# Patient Record
Sex: FEMALE | Race: OTHER | URBAN - METROPOLITAN AREA
[De-identification: names, ages, dates, MRNs, and addresses within clinical notes are randomized per-mention and may not be internally consistent; named-entity substitution may affect disease eponyms.]

---

## 2017-04-17 ENCOUNTER — OUTPATIENT (OUTPATIENT)
Dept: OUTPATIENT SERVICES | Facility: HOSPITAL | Age: 37
LOS: 1 days | Discharge: HOME | End: 2017-04-17

## 2017-06-27 DIAGNOSIS — E78.5 HYPERLIPIDEMIA, UNSPECIFIED: ICD-10-CM

## 2017-06-27 DIAGNOSIS — Z00.00 ENCOUNTER FOR GENERAL ADULT MEDICAL EXAMINATION WITHOUT ABNORMAL FINDINGS: ICD-10-CM

## 2017-06-27 DIAGNOSIS — E11.9 TYPE 2 DIABETES MELLITUS WITHOUT COMPLICATIONS: ICD-10-CM

## 2019-01-19 ENCOUNTER — HOSPITAL ENCOUNTER (EMERGENCY)
Facility: HOSPITAL | Age: 39
Discharge: HOME | End: 2019-01-19
Attending: EMERGENCY MEDICINE
Payer: COMMERCIAL

## 2019-01-19 ENCOUNTER — APPOINTMENT (EMERGENCY)
Dept: RADIOLOGY | Facility: HOSPITAL | Age: 39
End: 2019-01-19
Payer: COMMERCIAL

## 2019-01-19 VITALS
HEART RATE: 96 BPM | BODY MASS INDEX: 27.32 KG/M2 | RESPIRATION RATE: 20 BRPM | OXYGEN SATURATION: 98 % | HEIGHT: 66 IN | TEMPERATURE: 98.5 F | SYSTOLIC BLOOD PRESSURE: 140 MMHG | WEIGHT: 170 LBS | DIASTOLIC BLOOD PRESSURE: 72 MMHG

## 2019-01-19 DIAGNOSIS — R05.9 COUGH: ICD-10-CM

## 2019-01-19 DIAGNOSIS — J06.9 URI (UPPER RESPIRATORY INFECTION): Primary | ICD-10-CM

## 2019-01-19 LAB
ANION GAP SERPL CALCULATED.3IONS-SCNC: 5 MMOL/L (ref 4–13)
ATRIAL RATE: 73 BPM
BASOPHILS # BLD AUTO: 0.07 THOUSANDS/ΜL (ref 0–0.1)
BASOPHILS NFR BLD AUTO: 1 % (ref 0–1)
BILIRUB UR QL STRIP: NEGATIVE
BUN SERPL-MCNC: 7 MG/DL (ref 5–25)
CALCIUM SERPL-MCNC: 8.9 MG/DL (ref 8.3–10.1)
CHLORIDE SERPL-SCNC: 104 MMOL/L (ref 100–108)
CLARITY UR: CLEAR
CO2 SERPL-SCNC: 27 MMOL/L (ref 21–32)
COLOR UR: YELLOW
CREAT SERPL-MCNC: 0.75 MG/DL (ref 0.6–1.3)
DEPRECATED D DIMER PPP: 1102 NG/ML (FEU)
EOSINOPHIL # BLD AUTO: 0.5 THOUSAND/ΜL (ref 0–0.61)
EOSINOPHIL NFR BLD AUTO: 5 % (ref 0–6)
ERYTHROCYTE [DISTWIDTH] IN BLOOD BY AUTOMATED COUNT: 13.2 % (ref 11.6–15.1)
EXT PREG TEST URINE: NEGATIVE
FLUAV AG SPEC QL: NORMAL
FLUBV AG SPEC QL: NORMAL
GFR SERPL CREATININE-BSD FRML MDRD: 100 ML/MIN/1.73SQ M
GLUCOSE SERPL-MCNC: 87 MG/DL (ref 65–140)
GLUCOSE UR STRIP-MCNC: NEGATIVE MG/DL
HCT VFR BLD AUTO: 43.8 % (ref 34.8–46.1)
HGB BLD-MCNC: 13.2 G/DL (ref 11.5–15.4)
HGB UR QL STRIP.AUTO: NEGATIVE
IMM GRANULOCYTES # BLD AUTO: 0.02 THOUSAND/UL (ref 0–0.2)
IMM GRANULOCYTES NFR BLD AUTO: 0 % (ref 0–2)
KETONES UR STRIP-MCNC: NEGATIVE MG/DL
LEUKOCYTE ESTERASE UR QL STRIP: NEGATIVE
LYMPHOCYTES # BLD AUTO: 1.7 THOUSANDS/ΜL (ref 0.6–4.47)
LYMPHOCYTES NFR BLD AUTO: 17 % (ref 14–44)
MCH RBC QN AUTO: 26.5 PG (ref 26.8–34.3)
MCHC RBC AUTO-ENTMCNC: 30.1 G/DL (ref 31.4–37.4)
MCV RBC AUTO: 88 FL (ref 82–98)
MONOCYTES # BLD AUTO: 1.25 THOUSAND/ΜL (ref 0.17–1.22)
MONOCYTES NFR BLD AUTO: 13 % (ref 4–12)
NEUTROPHILS # BLD AUTO: 6.49 THOUSANDS/ΜL (ref 1.85–7.62)
NEUTS SEG NFR BLD AUTO: 64 % (ref 43–75)
NITRITE UR QL STRIP: NEGATIVE
NRBC BLD AUTO-RTO: 0 /100 WBCS
P AXIS: 76 DEGREES
PH UR STRIP.AUTO: 6.5 [PH] (ref 4.5–8)
PLATELET # BLD AUTO: 265 THOUSANDS/UL (ref 149–390)
PMV BLD AUTO: 11.8 FL (ref 8.9–12.7)
POTASSIUM SERPL-SCNC: 4 MMOL/L (ref 3.5–5.3)
PR INTERVAL: 132 MS
PROT UR STRIP-MCNC: NEGATIVE MG/DL
QRS AXIS: 110 DEGREES
QRSD INTERVAL: 72 MS
QT INTERVAL: 402 MS
QTC INTERVAL: 442 MS
RBC # BLD AUTO: 4.99 MILLION/UL (ref 3.81–5.12)
RSV B RNA SPEC QL NAA+PROBE: NORMAL
SODIUM SERPL-SCNC: 136 MMOL/L (ref 136–145)
SP GR UR STRIP.AUTO: 1.01 (ref 1–1.03)
T WAVE AXIS: 16 DEGREES
TROPONIN I SERPL-MCNC: <0.02 NG/ML
UROBILINOGEN UR QL STRIP.AUTO: 0.2 E.U./DL
VENTRICULAR RATE: 73 BPM
WBC # BLD AUTO: 10.03 THOUSAND/UL (ref 4.31–10.16)

## 2019-01-19 PROCEDURE — 71046 X-RAY EXAM CHEST 2 VIEWS: CPT

## 2019-01-19 PROCEDURE — 36415 COLL VENOUS BLD VENIPUNCTURE: CPT | Performed by: EMERGENCY MEDICINE

## 2019-01-19 PROCEDURE — 94640 AIRWAY INHALATION TREATMENT: CPT

## 2019-01-19 PROCEDURE — 71275 CT ANGIOGRAPHY CHEST: CPT

## 2019-01-19 PROCEDURE — 85379 FIBRIN DEGRADATION QUANT: CPT | Performed by: EMERGENCY MEDICINE

## 2019-01-19 PROCEDURE — 93005 ELECTROCARDIOGRAM TRACING: CPT

## 2019-01-19 PROCEDURE — 93010 ELECTROCARDIOGRAM REPORT: CPT | Performed by: INTERNAL MEDICINE

## 2019-01-19 PROCEDURE — 84484 ASSAY OF TROPONIN QUANT: CPT | Performed by: EMERGENCY MEDICINE

## 2019-01-19 PROCEDURE — 80048 BASIC METABOLIC PNL TOTAL CA: CPT | Performed by: EMERGENCY MEDICINE

## 2019-01-19 PROCEDURE — 81025 URINE PREGNANCY TEST: CPT | Performed by: EMERGENCY MEDICINE

## 2019-01-19 PROCEDURE — 87631 RESP VIRUS 3-5 TARGETS: CPT | Performed by: EMERGENCY MEDICINE

## 2019-01-19 PROCEDURE — 96374 THER/PROPH/DIAG INJ IV PUSH: CPT

## 2019-01-19 PROCEDURE — 99284 EMERGENCY DEPT VISIT MOD MDM: CPT

## 2019-01-19 PROCEDURE — 81003 URINALYSIS AUTO W/O SCOPE: CPT

## 2019-01-19 PROCEDURE — 85025 COMPLETE CBC W/AUTO DIFF WBC: CPT | Performed by: EMERGENCY MEDICINE

## 2019-01-19 RX ORDER — KETOROLAC TROMETHAMINE 30 MG/ML
15 INJECTION, SOLUTION INTRAMUSCULAR; INTRAVENOUS ONCE
Status: COMPLETED | OUTPATIENT
Start: 2019-01-19 | End: 2019-01-19

## 2019-01-19 RX ORDER — IBUPROFEN 600 MG/1
600 TABLET ORAL EVERY 6 HOURS PRN
Qty: 30 TABLET | Refills: 0 | Status: SHIPPED | OUTPATIENT
Start: 2019-01-19 | End: 2021-09-14

## 2019-01-19 RX ORDER — FLUTICASONE PROPIONATE 50 MCG
1 SPRAY, SUSPENSION (ML) NASAL DAILY
Qty: 16 G | Refills: 0 | Status: SHIPPED | OUTPATIENT
Start: 2019-01-19 | End: 2021-09-14

## 2019-01-19 RX ADMIN — KETOROLAC TROMETHAMINE 15 MG: 30 INJECTION, SOLUTION INTRAMUSCULAR at 11:46

## 2019-01-19 RX ADMIN — IOHEXOL 85 ML: 350 INJECTION, SOLUTION INTRAVENOUS at 14:21

## 2019-01-19 RX ADMIN — ALBUTEROL SULFATE 5 MG: 2.5 SOLUTION RESPIRATORY (INHALATION) at 11:47

## 2019-01-19 NOTE — SEPSIS NOTE
Sepsis Note   Yamileth Ashraf 34 y o  female MRN: [id number]  Unit/Bed#: ED 06 Encounter: [id number]            Initial Sepsis Screening     [address] Name 01/19/19 1114                Is the patient's history suggestive of a new or worsening infection? (!)  Yes (Proceed)  -JE        Suspected source of infection pneumonia  -JE        Are two or more of the following signs & symptoms of infection both present and new to the patient? (!)  Yes (Proceed)  -JE        Indicate SIRS criteria Tachypnea > 20 resp per min; Tachycardia > 90 bpm  -JE        If the answer is yes to both questions, suspicion of sepsis is present          If severe sepsis is present AND tissue hypoperfusion perists in the hour after fluid resuscitation or lactate > 4, the patient meets criteria for SEPTIC SHOCK          Are any of the following organ dysfunction criteria present within 6 hours of suspected infection and SIRS criteria that are NOT considered to be chronic conditions? No  -JE        Organ dysfunction          Date of presentation of severe sepsis          Time of presentation of severe sepsis          Tissue hypoperfusion persists in the hour after crystalloid fluid administration, evidenced, by either:          Was hypotension present within one hour of the conclusion of crystalloid fluid administration?           Date of presentation of septic shock          Time of presentation of septic shock            User Key  (r) = Recorded By, (t) = Taken By, (c) = Cosigned By    Initials Name Provider Type    [address] [place] [address] Resident

## 2019-01-19 NOTE — ED ATTENDING ATTESTATION
Jeaneth Moore MD, saw and evaluated the patient  I have discussed the patient with the resident/non-physician practitioner and agree with the resident's/non-physician practitioner's findings, Plan of Care, and MDM as documented in the resident's/non-physician practitioner's note, except where noted  All available labs and Radiology studies were reviewed  At this point I agree with the current assessment done in the Emergency Department  I have conducted an independent evaluation of this patient a history and physical is as follows:    Patient presents to the emergency department with a chief complaint of upper respiratory infection symptoms  The patient reports approximately 6 days ago she developed nasal congestion, mild sore throat, myalgias, a cough productive of yellow sputum, and arthralgias  The patient denies any fevers and is tolerating p o  Well  The patient admits to a 1 year history of pain in her upper central chest that waxes and wanes  This is not exertional and when it does occur worsens with cough, deep breath, palpation, and bending or other certain positions  The patient denies any nausea associated with the pain, diaphoresis, or shortness of breath  Patient denies any lower extremity symptoms of a DVT  Physical exam demonstrates a pleasant alert nontoxic female in no apparent distress  The patient has obvious nasal congestion and edematous turbinates  The throat is mildly erythematous without abscess or exudate  The neck is supple nontender  Oral mucosa is moist   The lungs have equal breath sounds and mild end expiratory wheezes  Work of breathing is normal   There is no accessory muscle use  Heart had a regular rate rhythm  The chest is mildly tender to palpation at the upper sternum  There is no deformity, crepitance, induration, or fluctuance  The lower extremities are symmetric and nontender without edema  The upper extremities are symmetric and nontender  Neurologic exam is normal   Skin had no rash  Initial Impression:  Chronic current chest pain that has been stable and a viral upper respiratory tract infection      Critical Care Time  CritCare Time    Procedures

## 2019-01-19 NOTE — ED PROVIDER NOTES
History  Chief Complaint   Patient presents with    URI     family reports congestion, sore throat, chest, and back for 3 days  75-year-old female past medical history asthma mild intermittent presents to emergency department for evaluation of upper respiratory symptoms  Patient states that she was in [place] for 1 month visiting family in returned yesterday  Patient states that approximately 3 days ago while still in [facility] she started developed nasal congestion, rhinorrhea, cough, sore throat, arthralgias/myalgias  Patient states the symptoms have not improved has not taken anything at home for self-care yet  Patient also states he has productive cough producing a greenish sputum  Patient did not receive the flu vaccine  Patient also complains of 12 month history of intermittent left-sided chest pain  Pain is described as a intermittent mild 4/10 pain without radiation and is not associated with exertion or other activities  Patient states that since arriving home from [place] she has noticed the left-sided chest pain and that is increased with deep inhalation  Patient states the pain has been persistent for over 24 hours  Patient again states she flew from [facility] yesterday and is on oral contraceptive pills  Denies drug or alcohol use, chest wall trauma, fever, chills, nausea, vomiting, diarrhea, dysuria, hematuria, hematochezia, melena, genital discharge, abdominal pain, flank pain, chest pain, shortness of breath, dyspnea, pleuritic-type chest pain, history DVT/PE, history cancer, hemoptysis, rash, extremity weakness, facial droop, difficulty swallowing, suicidal ideation, homicide ideation, hallucinations  Patient does not have any risk factors for CAD such as diabetes mellitus, hypertension, hyperlipidemia, CKD, history of tobacco abuse, history of cocaine abuse, or family history of early CAD                     None       Past Medical History:   Diagnosis Date    Asthma        History reviewed  No pertinent surgical history  History reviewed  No pertinent family history  I have reviewed and agree with the history as documented  Social History   Substance Use Topics    Smoking status: Never Smoker    Smokeless tobacco: Never Used    Alcohol use Yes        Review of Systems   Constitutional: Negative for chills, diaphoresis, fatigue and fever  HENT: Positive for congestion, rhinorrhea and sore throat  Negative for ear discharge, facial swelling, hearing loss, sinus pain, sinus pressure, sneezing, tinnitus and trouble swallowing  Eyes: Negative for pain, discharge and redness  Respiratory: Positive for cough  Negative for choking, chest tightness, shortness of breath, wheezing and stridor  Cardiovascular: Negative for chest pain, palpitations and leg swelling  Gastrointestinal: Negative for abdominal distention, abdominal pain, blood in stool, constipation, diarrhea, nausea and vomiting  Endocrine: Negative for cold intolerance, polydipsia and polyuria  Genitourinary: Negative for difficulty urinating, dysuria, enuresis, flank pain, frequency and hematuria  Musculoskeletal: Negative for arthralgias, back pain, gait problem and neck stiffness  Skin: Negative for rash and wound  Neurological: Negative for dizziness, seizures, syncope, weakness, numbness and headaches  Hematological: Negative for adenopathy  Psychiatric/Behavioral: Negative for agitation, confusion, hallucinations, sleep disturbance and suicidal ideas  All other systems reviewed and are negative        Physical Exam  ED Triage Vitals [01/19/19 1038]   Temperature Pulse Respirations Blood Pressure SpO2   98 5 °F (36 9 °C) 94 22 141/73 100 %      Temp Source Heart Rate Source Patient Position - Orthostatic VS BP Location FiO2 (%)   Oral Monitor Sitting Left arm --      Pain Score       8           Orthostatic Vital Signs  Vitals:    01/19/19 1038 01/19/19 1330 01/19/19 1431   BP: 141/73 154/81 140/72   Pulse: 94 101 96   Patient Position - Orthostatic VS: Sitting Lying Lying       Physical Exam   Constitutional: She is oriented to person, place, and time  She appears well-developed and well-nourished  No distress  HENT:   Head: Normocephalic and atraumatic  Right Ear: External ear normal  Tympanic membrane is not injected, not erythematous, not retracted and not bulging  Left Ear: External ear normal  Tympanic membrane is not injected, not erythematous, not retracted and not bulging  Nose: Mucosal edema and rhinorrhea present  No sinus tenderness  No epistaxis  Right sinus exhibits no frontal sinus tenderness  Left sinus exhibits no frontal sinus tenderness  Mouth/Throat: Uvula is midline and mucous membranes are normal  Posterior oropharyngeal erythema present  No oropharyngeal exudate, posterior oropharyngeal edema or tonsillar abscesses  Eyes: Pupils are equal, round, and reactive to light  Conjunctivae and EOM are normal  Right eye exhibits no discharge  Left eye exhibits no discharge  Neck: No JVD present  Cardiovascular: Normal rate, regular rhythm, normal heart sounds and intact distal pulses  Exam reveals no gallop and no friction rub  No murmur heard  Pulmonary/Chest: Effort normal  No stridor  No respiratory distress  She has no decreased breath sounds  She has wheezes in the right upper field and the left upper field  She has no rhonchi  She has no rales  Abdominal: Soft  Bowel sounds are normal  She exhibits no distension and no mass  There is no tenderness  There is no rebound and no guarding  Musculoskeletal: Normal range of motion  She exhibits no edema, tenderness or deformity  Lymphadenopathy:     She has no cervical adenopathy  Neurological: She is alert and oriented to person, place, and time  She has normal strength  No cranial nerve deficit or sensory deficit  GCS eye subscore is 4  GCS verbal subscore is 5  GCS motor subscore is 6     Reflex Scores:       Patellar reflexes are 2+ on the right side and 2+ on the left side  UE and LE 5/5 strength, No focal neuro deficits  Skin: Skin is warm, dry and intact  Capillary refill takes less than 2 seconds  She is not diaphoretic  Psychiatric: She has a normal mood and affect  Her speech is normal and behavior is normal  Judgment and thought content normal    Nursing note and vitals reviewed        ED Medications  Medications   ketorolac (TORADOL) injection 15 mg (15 mg Intravenous Given 1/19/19 1146)   albuterol inhalation solution 5 mg (5 mg Nebulization Given 1/19/19 1147)   iohexol (OMNIPAQUE) 350 MG/ML injection (MULTI-DOSE) 85 mL (85 mL Intravenous Given 1/19/19 1421)       Diagnostic Studies  Results Reviewed     Procedure Component Value Units Date/Time    POCT urinalysis dipstick [[id number]]  (Normal) Resulted:  01/19/19 1406    Lab Status:  Final result Specimen:  Urine Updated:  01/19/19 1406    POCT pregnancy, urine [[id number]]  (Normal) Resulted:  01/19/19 1406    Lab Status:  Final result Updated:  01/19/19 1406     EXT PREG TEST UR (Ref: Negative) Negative    ED Urine Macroscopic [[id number]] Collected:  01/19/19 1408    Lab Status:  Final result Specimen:  Urine Updated:  01/19/19 1406     Color, UA Yellow     Clarity, UA Clear     pH, UA 6 5     Leukocytes, UA Negative     Nitrite, UA Negative     Protein, UA Negative mg/dl      Glucose, UA Negative mg/dl      Ketones, UA Negative mg/dl      Urobilinogen, UA 0 2 E U /dl      Bilirubin, UA Negative     Blood, UA Negative     Specific Gravity, UA 1 015    Narrative:       CLINITEK RESULT    D-Dimer [[id number]]  (Abnormal) Collected:  01/19/19 1147    Lab Status:  Final result Specimen:  Blood from Arm, Right Updated:  01/19/19 1249     D-Dimer, Quant 1,102 (H) ng/ml (FEU)     Troponin I [[id number]]  (Normal) Collected:  01/19/19 1147    Lab Status:  Final result Specimen:  Blood from Arm, Right Updated:  01/19/19 1224     Troponin I <0 02 ng/mL Basic metabolic panel [[id number]] Collected:  01/19/19 1147    Lab Status:  Final result Specimen:  Blood from Arm, Right Updated:  01/19/19 1220     Sodium 136 mmol/L      Potassium 4 0 mmol/L      Chloride 104 mmol/L      CO2 27 mmol/L      ANION GAP 5 mmol/L      BUN 7 mg/dL      Creatinine 0 75 mg/dL      Glucose 87 mg/dL      Calcium 8 9 mg/dL      eGFR 100 ml/min/1 73sq m     Narrative:         National Kidney Disease Education Program recommendations are as follows:  GFR calculation is accurate only with a steady state creatinine  Chronic Kidney disease less than 60 ml/min/1 73 sq  meters  Kidney failure less than 15 ml/min/1 73 sq  meters  CBC and differential [[id number]]  (Abnormal) Collected:  01/19/19 1147    Lab Status:  Final result Specimen:  Blood from Arm, Right Updated:  01/19/19 1201     WBC 10 03 Thousand/uL      RBC 4 99 Million/uL      Hemoglobin 13 2 g/dL      Hematocrit 43 8 %      MCV 88 fL      MCH 26 5 (L) pg      MCHC 30 1 (L) g/dL      RDW 13 2 %      MPV 11 8 fL      Platelets 273 Thousands/uL      nRBC 0 /100 WBCs      Neutrophils Relative 64 %      Immat GRANS % 0 %      Lymphocytes Relative 17 %      Monocytes Relative 13 (H) %      Eosinophils Relative 5 %      Basophils Relative 1 %      Neutrophils Absolute 6 49 Thousands/µL      Immature Grans Absolute 0 02 Thousand/uL      Lymphocytes Absolute 1 70 Thousands/µL      Monocytes Absolute 1 25 (H) Thousand/µL      Eosinophils Absolute 0 50 Thousand/µL      Basophils Absolute 0 07 Thousands/µL     Influenza A/B and RSV by PCR [[id number]] Collected:  01/19/19 1147    Lab Status: In process Specimen:  Nasopharyngeal from Nasopharyngeal Swab Updated:  01/19/19 1153                 CTA ED chest PE study   ED Interpretation by Lisette Tubbs DO (01/19 1440)      No pulmonary arterial embolism; somewhat limited evaluation secondary to motion  No pulmonary consolidation                    Workstation performed: [id number] Final Result by Antolin Lambert MD (01/19 1438)      No pulmonary arterial embolism; somewhat limited evaluation secondary to motion  No pulmonary consolidation  Workstation performed: [id number]         XR chest 2 views   ED Interpretation by Aleks Maria DO (01/19 1234)   No focal infiltrates             Procedures  ECG 12 Lead Documentation  Date/Time: 1/19/2019 12:32 PM  Performed by: Venus Ruiz  Authorized by: Ha HER     Indications / Diagnosis:  Chest pain x 1 year  Patient location:  ED  Previous ECG:     Previous ECG:  Unavailable    Comparison to cardiac monitor: Yes    Interpretation:     Interpretation: non-specific    Rate:     ECG rate:  73    ECG rate assessment: normal    Rhythm:     Rhythm: sinus rhythm    Ectopy:     Ectopy: none    QRS:     QRS axis:  Right    QRS intervals:  Normal  Conduction:     Conduction: normal    ST segments:     ST segments:  Normal  T waves:     T waves: normal            Phone Consults  ED Phone Contact    ED Course               PERC Rule for PE      Most Recent Value   PERC Rule for PE   Age >=50  0 Filed at: 01/19/2019 1111   HR >=100  0 Filed at: 01/19/2019 1111   O2 Sat on room air < 95%  0 Filed at: 01/19/2019 1111   History of PE or DVT  0 Filed at: 01/19/2019 1111   Recent trauma or surgery  0 Filed at: 01/19/2019 1111   Hemoptysis  0 Filed at: 01/19/2019 1111   Exogenous estrogen  1 Filed at: 01/19/2019 1111   Unilateral leg swelling  0 Filed at: 01/19/2019 1111   PERC Rule for PE Results  1 Filed at: 01/19/2019 1111                Initial Sepsis Screening     Row Name 01/19/19 1114                Is the patient's history suggestive of a new or worsening infection? (!)  Yes (Proceed)  -JE        Suspected source of infection pneumonia  -JE        Are two or more of the following signs & symptoms of infection both present and new to the patient?  (!)  Yes (Proceed)  -JE        Indicate SIRS criteria Tachypnea > 20 resp per min; Tachycardia > 90 bpm  -JE        If the answer is yes to both questions, suspicion of sepsis is present          If severe sepsis is present AND tissue hypoperfusion perists in the hour after fluid resuscitation or lactate > 4, the patient meets criteria for SEPTIC SHOCK          Are any of the following organ dysfunction criteria present within 6 hours of suspected infection and SIRS criteria that are NOT considered to be chronic conditions? No  -JE        Organ dysfunction          Date of presentation of severe sepsis          Time of presentation of severe sepsis          Tissue hypoperfusion persists in the hour after crystalloid fluid administration, evidenced, by either:          Was hypotension present within one hour of the conclusion of crystalloid fluid administration?         Date of presentation of septic shock          Time of presentation of septic shock            User Key  (r) = Recorded By, (t) = Taken By, (c) = Cosigned By    Initials Name Provider Type    [address] [place] [address], [facility]            Chanel Jaramillo Criteria for PE      Most Recent Value   Darrell' Criteria for PE   Clinical signs and symptoms of DVT  0 Filed at: 01/19/2019 1111   PE is primary diagnosis or equally likely  0 Filed at: 01/19/2019 1111   HR >100  0 Filed at: 01/19/2019 1111   Immobilization at least 3 days or Surgery in the previous 4 weeks  0 Filed at: 01/19/2019 1111   Previous, objectively diagnosed PE or DVT  0 Filed at: 01/19/2019 1111   Hemoptysis  0 Filed at: 01/19/2019 1111   Malignancy with treatment within 6 months or palliative  0 Filed at: 01/19/2019 1111   Wells' Criteria Total  0 Filed at: 01/19/2019 1111            MDM  Number of Diagnoses or Management Options  Cough: new and requires workup  URI (upper respiratory infection): new and requires workup  Diagnosis management comments: [place], PERC Pos, Dimer 1100 --CT PE study ordered  Dimer, trop, EKG, BMP, CBC, Neb, flu swab    1  URI with cough  -Motrin, Flonase, PCP f/u with [facility]  -ED PRN      CritCare Time    Disposition  Final diagnoses:   URI (upper respiratory infection)   Cough     Time reflects when diagnosis was documented in both MDM as applicable and the Disposition within this note     Time User Action Codes Description Comment    1/19/2019  2:41 PM Kenneth Rendon Add [J06 9] URI (upper respiratory infection)     1/19/2019  2:41 PM Kenneth Rendon Add [R05] Cough       ED Disposition     ED Disposition Condition Comment    Discharge  Leonora Ng discharge to home/self care  Condition at discharge: Good        Follow-up Information     Follow up With Specialties Details Why Contact Info Additional Information    [employer]    [phone]       [address] [place] [address] Emergency Department Emergency Medicine Go to If symptoms worsen, As needed [address] [facility] ED, [facility] , [place], [place], [address]          Patient's Medications   Discharge Prescriptions    FLUTICASONE (FLONASE) 50 MCG/ACT NASAL SPRAY    1 spray into each nostril daily       Start Date: 1/19/2019 End Date: --       Order Dose: 1 spray       Quantity: 16 g    Refills: 0    IBUPROFEN (MOTRIN) 600 MG TABLET    Take 1 tablet (600 mg total) by mouth every 6 (six) hours as needed for moderate pain or headaches       Start Date: 1/19/2019 End Date: --       Order Dose: 600 mg       Quantity: 30 tablet    Refills: 0     No discharge procedures on file  ED Provider  Attending physically available and evaluated Leonora Ng  I managed the patient along with the ED Attending      Electronically Signed by         Agueda Quezada DO  01/19/19 3529

## 2019-01-19 NOTE — DISCHARGE INSTRUCTIONS
Bronquitis aguda   LO QUE NECESITA SABER:   La bronquitis aguda es la inflamación e irritación de ina vías respiratorias  Esta irritación puede provocar que tosa o que tenga otros problemas de respiración  La bronquitis aguda suele comenzar debido a otra enfermedad, elio un resfriado o la gripe  La enfermedad se propaga de montoya nariz y garganta a montoya tráquea y Marcine Stevie  La bronquitis a menudo es conocida elio resfriado de pecho  La bronquitis aguda generalmente dura alrededor de 3 a 6 semanas y no es keren enfermedad grave  La tos podría durar por varias semanas  INSTRUCCIONES SOBRE EL [facility]ITAL[facility]:   Regrese a la ria de emergencias si:   · Usted expectora radu  · Ina labios o uñas de los dedos se ponen azules  · Usted siente que no está recibiendo suficiente aire cuando respira  Pregúntele a montoya [place] vitaminas y minerales son adecuados para usted  · Usted tiene fiebre  · Ina problemas respiratorios no desaparecen o empeoran  · Montoya tos no mejora dentro de 4 semanas  · Usted tiene preguntas o inquietudes acerca de montoya condición o cuidado  Cuidados personales:   · Descanse más  El descanso ayuda a montoya cuerpo a sanar  Empiece a hacer un poco más día a día  Descanse cuando usted sienta que es necesario  · Evite irritantes en el aire  Evite productos químicos, gases y polvo  Use keren mascarilla si tiene que trabajar alrededor de polvo o gases  Permanezca dentro cuando los niveles de contaminación forrest altos  Si tiene alergias, permanezca adentro cuando el conteo de polen sea CROSSCANONBY  No use productos en aerosol, elio desodorante, aerosol contra los insectos y aerosol para el corrie  · No fume o esté alrededor de personas que fuman  La nicotina y otros químicos en los cigarrillos y cigarros dañan la cilia que saca la mucosidad de ina pulmones  Pida información a montoya médico si usted actualmente fuma y necesita ayuda para dejar de fumar   Los cigarrillos electrónicos o tabaco sin humo todavía contienen nicotina  Consulte con montoya médico antes de [employer]  · [address] se le haya indicado  Los líquidos ayudan a mantener humectadas gaby vías respiratorias y ayudarle a eliminar las flemas por medio de la tos  Es posible que usted necesite luke más líquidos si tiene bronquitis [place]  Pregunte cuánto líquido debe luke cada día y cuáles líquidos son los más adecuados para usted  · Use un humidificador o vaporizador  Use un humidificador de clarisa frío o un vaporizador para elevar la humedad en montoya casa  [place] podría ayudarle a respirar más fácilmente y al mismo tiempo disminuir la tos  Disminuya montoya riesgo para la bronquitis aguda:   · Vaya a que le pongan las vacunas necesarias  Pregúntele a montoya médico si usted debería ser vacunado contra la gripe o la neumonía  · Evite la propagación de gérmenes  Usted puede disminuir montoya riesgo de bronquitis [place] y otras enfermedades de la siguiente manera:     ¨ [place] frecuentemente con agua y Brandan  Lleve keren loción o gel antiséptico para las abimael  Usted puede usar la loción o el gel para limpiar gaby abimael cuando no haya agua disponible  ¨ No se toque los ojos, la nariz o la boca a menos que se haya lavado las abimael gustavo  ¨ Siempre cubra montoya boca al toser para evitar la propagación de gérmenes  Lo mejor es toser en un pañuelo desechable o en la manga de montoya camisa, en lugar de en montoya mano  Pídale a los que le rodean que cubran gaby bocas al toser  ¨ Trate de evitar a las personas que están resfriadas o tienen gripe  Si usted está enfermo, manténgase alejado de otras personas lo más que pueda  Medicamentos:  Montoya médico podría  darle cualquiera de lo siguiente:  · El ibuprofeno o el acetaminofeno  son medicamentos que pueden ayudar a bajar montoya fiebre  Están disponibles sin receta médica  Consulte con montoya médico cuál medicamento es el adecuado para usted  Pregunte la cantidad y la frecuencia con que debe tomarlos  [address]  Estos medicamentos pueden provocar sangrado estomacal si no se johanna correctamente  El ibuprofeno puede provocar daño al Lender Soler  No tome ibuprofeno si usted tiene enfermedad de los riñones, [place] o si es alérgico a la aspirina  El acetaminofeno puede dañar el hígado  No tome más de 4,000 miligramos en 24 horas  · Los descongestionantes  ayudan a despejar la mucosidad en gaby pulmones y que sea más fácil expectorarla  [place] puede ayudarle a respirar mejor  · Los jarabes para la tos  disminuyen montoya necesidad de toser  Si montoya tos produce mucosidad, no tome un supresor de la tos a menos que montoya médico se lo indique  Montoya médico podría sugerirle que tome un supresor de la tos en la noche para que pueda descansar  · Inhaladores  podrían ser Emerson Miu  Montoya médico podría darle ana maria o más inhaladores para ayudarle a respirar más fácil y [place] menos tos  Un inhalador le administra medicamento para abrir gaby vías aéreas  Pídale a montoya médico que le muestre cómo usar montoya inhalador correctamente  · [place] gaby medicamentos elio se le haya indicado  Consulte con montoya médico si usted nadiya que montoya medicamento no le está ayudando o si presenta efectos secundarios  Infórmele si es alérgico a algún medicamento  Mantenga keren lista actualizada de los [place], las vitaminas y los productos herbales que radhika  Incluya los siguientes datos de los medicamentos: cantidad, frecuencia y motivo de administración  Traiga con usted la lista o los envases de la píldoras a gaby citas de seguimiento  Lleve la lista de los medicamentos con usted en marni de keren emergencia  Acuda a gaby consultas de control con montoya médico según le indicaron  Escriba las preguntas que tenga para que recuerde hacerlas nidhi gaby citas de seguimiento  © 2017 [facility] Information is for End User's use only and may not be sold, redistributed or otherwise used for commercial purposes   All illustrations and images included in [employer] are the copyrighted property of A D A M , Inc  or Zhang Lerma  Esta información es sólo para uso en educación  Montoya intención no es darle un consejo médico sobre enfermedades o tratamientos  Colsulte con montoya Rosemarie Lesser farmacéutico antes de seguir cualquier régimen médico para saber si es seguro y efectivo para usted  Infección respiratoria superior, cuidados ambulatorios   INFORMACIÓN GENERAL:   Keren infección respiratoria superior  también se conoce elio el resfriado común  Esta puede afectar montoya nariz, garganta, oídos y los senos frontales de la mindi  Los síntomas más comunes incluyen los siguientes:   · Secreción nasal o nariz tapada    · Estornudos y tos    · [place] irritada o ronquera    · Ojos enrojecidos, acuosos e irritados    · Cansancio o desasosiego    · [place] y fiebre    · Dolor de jose, anthony corporales o músculos adoloridos  Busque cuidados inmediatos para los siguientes síntomas:   · Anthony de jose o jennifer rígido    · Luces brillantes que lastiman gaby ojos    · Dolor de pecho o dificultad para respirar  El tratamiento para keren infección respiratoria superior  puede llegar a incluir cualquiera de los siguientes:  · Los descongestionantes  ayudan a disminuir la congestión nasal y ayudarlo a respirar  No use aerosol descongestionante por más de unos pocos días  · Los supresores de tos  ayudan a disminuir la tos  Pregúntele a montoya médico cuál tipo de medicamento para la tos es más adecuada para usted  Algunos supresores de tos requieren [employer], otros no  · Los antiiflamatorios no esteroideos (AINEs) ayudan a reducir inflamación y dolor o [place]  [place] medicamento esta disponible con o sin keren receta médica  Los AINEs podrían causar sangrado estomacal o problemas en los riñones en [employer]  Si usted esta tomando un anticoágulante, siempre  pregunte a montoya proveedor de teresa si los AINEs son seguros para usted   [address] [facility] medicamento, jefe siempre la etiqueta de información y siga gaby indicaciones  Cuide de montoya infección respiratoria superior:   · Descanse  hasta que no tenga fiebre o se siente mejor  · [place] líquidos según indicaciones para prevenir la deshidratación  [place] 8 a 10 tazas de líquidos al día  Los líquidos buenos incluyen el agua, ginger ale, té o jugos de frutas  · Abiel gárgaras  con agua tibia para que montoya garganta irritada se sienta mejor  Abiel agua salada añadiendo 1/4 de cucharadita de sal a 1 taza de agua tibia  Usted puede también chupar dulces duros o pastillas para la garganta  · Las gotas nasales de salina  ayudan a aliviar montoya congestión y pueden comprarse sin keren receta  · Rumson un baño o ducha tibia  para ayudarle a disminuir los anthony y [place] a respirar mejor  · Use un humidificador de clarisa frío  para aumentar la humedad en el aire y hacer el respirar más fácil  No use un humidificador de clarisa tibio  Prevenga el contagio de gérmenes:   · Evite a otras personas por los primeros 2 a 3 días de montoya resfriado  Los gérmenes se propagan fácilmente nidhi Anuj  · No comparta alimentos, bebidas,  toallas o artículos personas con [employer]  · Lávese las abimael con frecuencia  Use agua y Brandan  [employer] las abimael después de usar el baño, cambiar el pañal de un susan o estornudar  Lávese las abimael antes de preparar o consumir alimentos  Cúbrase la boca y Susy Rumson and Benjamín con keren toallita desechable cuando estornuda o tose  Programe keren manny con montoya proveedor de [employer] se le haya indicado: Anote gaby preguntas para que se acuerde de hacerlas nidhi gaby visitas  ACUERDOS SOBRE MONTOYA CUIDADO:   Usted tiene el derecho de participar en la planificación de montoya cuidado  Aprenda todo lo que pueda sobre montoya condición y elio darle tratamiento  Discuta con gaby médicos gaby opciones de tratamiento para juntos decidir el cuidado que usted quiere recibir   Usted siempre tiene el derecho a rechazar montoya tratamiento  Esta información es sólo para uso en educación  Montoya intención no es darle un consejo médico sobre enfermedades o tratamientos  Colsulte con montoya Enrique Pinta farmacéutico antes de seguir cualquier régimen médico para saber si es seguro y efectivo para usted  © 2014 [address] is for End User's use only and may not be sold, redistributed or otherwise used for commercial purposes  All illustrations and images included in CareNotes® are the copyrighted property of A D A M , Inc  or Zhang Lerma

## 2019-05-22 ENCOUNTER — HOSPITAL ENCOUNTER (EMERGENCY)
Facility: HOSPITAL | Age: 39
Discharge: HOME | End: 2019-05-22
Attending: EMERGENCY MEDICINE | Admitting: EMERGENCY MEDICINE
Payer: COMMERCIAL

## 2019-05-22 VITALS
DIASTOLIC BLOOD PRESSURE: 88 MMHG | TEMPERATURE: 98.3 F | BODY MASS INDEX: 28.75 KG/M2 | RESPIRATION RATE: 19 BRPM | SYSTOLIC BLOOD PRESSURE: 146 MMHG | OXYGEN SATURATION: 100 % | HEART RATE: 64 BPM | WEIGHT: 178.13 LBS

## 2019-05-22 DIAGNOSIS — R53.1 GENERALIZED WEAKNESS: Primary | ICD-10-CM

## 2019-05-22 DIAGNOSIS — R42 DIZZINESS: ICD-10-CM

## 2019-05-22 LAB
ALBUMIN SERPL BCP-MCNC: 3.7 G/DL (ref 3.5–5)
ALP SERPL-CCNC: 74 U/L (ref 46–116)
ALT SERPL W P-5'-P-CCNC: 10 U/L (ref 12–78)
ANION GAP SERPL CALCULATED.3IONS-SCNC: 3 MMOL/L (ref 4–13)
AST SERPL W P-5'-P-CCNC: 13 U/L (ref 5–45)
ATRIAL RATE: 57 BPM
BASOPHILS # BLD AUTO: 0.07 THOUSANDS/ΜL (ref 0–0.1)
BASOPHILS NFR BLD AUTO: 1 % (ref 0–1)
BILIRUB SERPL-MCNC: 0.47 MG/DL (ref 0.2–1)
BUN SERPL-MCNC: 10 MG/DL (ref 5–25)
CALCIUM SERPL-MCNC: 8.6 MG/DL (ref 8.3–10.1)
CHLORIDE SERPL-SCNC: 105 MMOL/L (ref 100–108)
CO2 SERPL-SCNC: 28 MMOL/L (ref 21–32)
CREAT SERPL-MCNC: 0.79 MG/DL (ref 0.6–1.3)
EOSINOPHIL # BLD AUTO: 0.34 THOUSAND/ΜL (ref 0–0.61)
EOSINOPHIL NFR BLD AUTO: 4 % (ref 0–6)
ERYTHROCYTE [DISTWIDTH] IN BLOOD BY AUTOMATED COUNT: 12.8 % (ref 11.6–15.1)
GFR SERPL CREATININE-BSD FRML MDRD: 94 ML/MIN/1.73SQ M
GLUCOSE SERPL-MCNC: 100 MG/DL (ref 65–140)
HCT VFR BLD AUTO: 42.4 % (ref 34.8–46.1)
HGB BLD-MCNC: 13 G/DL (ref 11.5–15.4)
IMM GRANULOCYTES # BLD AUTO: 0.02 THOUSAND/UL (ref 0–0.2)
IMM GRANULOCYTES NFR BLD AUTO: 0 % (ref 0–2)
LYMPHOCYTES # BLD AUTO: 2.25 THOUSANDS/ΜL (ref 0.6–4.47)
LYMPHOCYTES NFR BLD AUTO: 26 % (ref 14–44)
MCH RBC QN AUTO: 27 PG (ref 26.8–34.3)
MCHC RBC AUTO-ENTMCNC: 30.7 G/DL (ref 31.4–37.4)
MCV RBC AUTO: 88 FL (ref 82–98)
MONOCYTES # BLD AUTO: 0.7 THOUSAND/ΜL (ref 0.17–1.22)
MONOCYTES NFR BLD AUTO: 8 % (ref 4–12)
NEUTROPHILS # BLD AUTO: 5.43 THOUSANDS/ΜL (ref 1.85–7.62)
NEUTS SEG NFR BLD AUTO: 61 % (ref 43–75)
NRBC BLD AUTO-RTO: 0 /100 WBCS
P AXIS: 70 DEGREES
PLATELET # BLD AUTO: 273 THOUSANDS/UL (ref 149–390)
PMV BLD AUTO: 11.6 FL (ref 8.9–12.7)
POTASSIUM SERPL-SCNC: 4.5 MMOL/L (ref 3.5–5.3)
PR INTERVAL: 148 MS
PROT SERPL-MCNC: 7.3 G/DL (ref 6.4–8.2)
QRS AXIS: 52 DEGREES
QRSD INTERVAL: 74 MS
QT INTERVAL: 420 MS
QTC INTERVAL: 408 MS
RBC # BLD AUTO: 4.81 MILLION/UL (ref 3.81–5.12)
SODIUM SERPL-SCNC: 136 MMOL/L (ref 136–145)
T WAVE AXIS: 4 DEGREES
TSH SERPL DL<=0.05 MIU/L-ACNC: 0.8 UIU/ML (ref 0.36–3.74)
VENTRICULAR RATE: 57 BPM
WBC # BLD AUTO: 8.81 THOUSAND/UL (ref 4.31–10.16)

## 2019-05-22 PROCEDURE — 36415 COLL VENOUS BLD VENIPUNCTURE: CPT | Performed by: EMERGENCY MEDICINE

## 2019-05-22 PROCEDURE — 85025 COMPLETE CBC W/AUTO DIFF WBC: CPT | Performed by: EMERGENCY MEDICINE

## 2019-05-22 PROCEDURE — 84443 ASSAY THYROID STIM HORMONE: CPT | Performed by: EMERGENCY MEDICINE

## 2019-05-22 PROCEDURE — 93005 ELECTROCARDIOGRAM TRACING: CPT

## 2019-05-22 PROCEDURE — 93010 ELECTROCARDIOGRAM REPORT: CPT | Performed by: INTERNAL MEDICINE

## 2019-05-22 PROCEDURE — 99284 EMERGENCY DEPT VISIT MOD MDM: CPT | Performed by: EMERGENCY MEDICINE

## 2019-05-22 PROCEDURE — 99285 EMERGENCY DEPT VISIT HI MDM: CPT

## 2019-05-22 PROCEDURE — 80053 COMPREHEN METABOLIC PANEL: CPT | Performed by: EMERGENCY MEDICINE

## 2019-05-22 RX ORDER — FLUTICASONE FUROATE AND VILANTEROL 200; 25 UG/1; UG/1
1 POWDER RESPIRATORY (INHALATION) DAILY
COMMUNITY
End: 2021-09-14

## 2019-05-22 RX ORDER — ALBUTEROL SULFATE 90 UG/1
1 AEROSOL, METERED RESPIRATORY (INHALATION) EVERY 6 HOURS PRN
COMMUNITY

## 2019-05-22 RX ORDER — MONTELUKAST SODIUM 10 MG/1
10 TABLET ORAL
COMMUNITY

## 2019-07-26 ENCOUNTER — HOSPITAL ENCOUNTER (EMERGENCY)
Facility: HOSPITAL | Age: 39
Discharge: HOME | End: 2019-07-26
Attending: EMERGENCY MEDICINE | Admitting: EMERGENCY MEDICINE
Payer: COMMERCIAL

## 2019-07-26 VITALS
SYSTOLIC BLOOD PRESSURE: 144 MMHG | WEIGHT: 175 LBS | RESPIRATION RATE: 20 BRPM | HEART RATE: 85 BPM | BODY MASS INDEX: 28.25 KG/M2 | TEMPERATURE: 98.1 F | DIASTOLIC BLOOD PRESSURE: 99 MMHG | OXYGEN SATURATION: 100 %

## 2019-07-26 DIAGNOSIS — M54.42 ACUTE LEFT-SIDED LOW BACK PAIN WITH LEFT-SIDED SCIATICA: Primary | ICD-10-CM

## 2019-07-26 LAB
BACTERIA UR QL AUTO: ABNORMAL /HPF
BILIRUB UR QL STRIP: NEGATIVE
CLARITY UR: ABNORMAL
COLOR UR: ABNORMAL
EXT PREG TEST URINE: NEGATIVE
EXT. CONTROL ED NAV: NORMAL
GLUCOSE UR STRIP-MCNC: NEGATIVE MG/DL
HGB UR QL STRIP.AUTO: ABNORMAL
HYALINE CASTS #/AREA URNS LPF: ABNORMAL /LPF
KETONES UR STRIP-MCNC: NEGATIVE MG/DL
LEUKOCYTE ESTERASE UR QL STRIP: NEGATIVE
NITRITE UR QL STRIP: NEGATIVE
NON-SQ EPI CELLS URNS QL MICRO: ABNORMAL /HPF
PH UR STRIP.AUTO: 5.5 [PH] (ref 4.5–8)
PROT UR STRIP-MCNC: NEGATIVE MG/DL
RBC #/AREA URNS AUTO: ABNORMAL /HPF
SP GR UR STRIP.AUTO: >=1.03 (ref 1–1.03)
UROBILINOGEN UR QL STRIP.AUTO: 0.2 E.U./DL
WBC #/AREA URNS AUTO: ABNORMAL /HPF

## 2019-07-26 PROCEDURE — 81025 URINE PREGNANCY TEST: CPT | Performed by: PHYSICIAN ASSISTANT

## 2019-07-26 PROCEDURE — 81001 URINALYSIS AUTO W/SCOPE: CPT

## 2019-07-26 PROCEDURE — 99283 EMERGENCY DEPT VISIT LOW MDM: CPT

## 2019-07-26 PROCEDURE — 99284 EMERGENCY DEPT VISIT MOD MDM: CPT | Performed by: PHYSICIAN ASSISTANT

## 2019-07-26 PROCEDURE — 96372 THER/PROPH/DIAG INJ SC/IM: CPT

## 2019-07-26 RX ORDER — NAPROXEN 500 MG/1
500 TABLET ORAL 2 TIMES DAILY WITH MEALS
Qty: 30 TABLET | Refills: 0 | Status: SHIPPED | OUTPATIENT
Start: 2019-07-26 | End: 2021-09-14

## 2019-07-26 RX ORDER — METHOCARBAMOL 500 MG/1
500 TABLET, FILM COATED ORAL 2 TIMES DAILY
Qty: 20 TABLET | Refills: 0 | Status: SHIPPED | OUTPATIENT
Start: 2019-07-26 | End: 2021-09-14

## 2019-07-26 RX ORDER — LIDOCAINE 50 MG/G
1 PATCH TOPICAL ONCE
Status: DISCONTINUED | OUTPATIENT
Start: 2019-07-26 | End: 2019-07-26 | Stop reason: HOSPADM

## 2019-07-26 RX ORDER — METHOCARBAMOL 500 MG/1
500 TABLET, FILM COATED ORAL ONCE
Status: COMPLETED | OUTPATIENT
Start: 2019-07-26 | End: 2019-07-26

## 2019-07-26 RX ORDER — KETOROLAC TROMETHAMINE 30 MG/ML
15 INJECTION, SOLUTION INTRAMUSCULAR; INTRAVENOUS ONCE
Status: COMPLETED | OUTPATIENT
Start: 2019-07-26 | End: 2019-07-26

## 2019-07-26 RX ADMIN — METHOCARBAMOL 500 MG: 500 TABLET, FILM COATED ORAL at 15:00

## 2019-07-26 RX ADMIN — KETOROLAC TROMETHAMINE 15 MG: 30 INJECTION, SOLUTION INTRAMUSCULAR at 15:00

## 2019-07-26 RX ADMIN — LIDOCAINE 1 PATCH: 50 PATCH CUTANEOUS at 15:00

## 2019-07-26 NOTE — ED PROVIDER NOTES
History  Chief Complaint   Patient presents with    Back Pain     lower back pain radiating to L leg  denies injury  1 week in duration     Randee Diaz is a 35 yo F presenting with bilateral lower back pain with radiation down L posterior leg to level of foot x 1 week  States pain is sharp and worsens with movement/bending  Denies recent injury/trauma to region  Denies urinary symptoms including dysuria, urgency, or frequency  Pt is currently on menstrual period  No loss of control of bowel or bladder function  No saddle anesthesia  No fevers/chills/IVDA  No meds PTA for pain  History provided by:  Patient   used: No    Back Pain   Location:  Lumbar spine  Quality:  Stabbing  Radiates to: L posterior leg  Onset quality:  Unable to specify  Duration:  1 week  Timing:  Constant  Progression:  Waxing and waning  Chronicity:  New  Context: not falling and not lifting heavy objects    Relieved by:  Being still  Worsened by:  Palpation, touching and bending  Associated symptoms: no abdominal pain, no bladder incontinence, no bowel incontinence, no chest pain, no fever, no headaches, no numbness and no weakness        Prior to Admission Medications   Prescriptions Last Dose Informant Patient Reported? Taking? albuterol (PROVENTIL HFA,VENTOLIN HFA) 90 mcg/act inhaler   Yes No   Sig: Inhale 1 puff every 6 (six) hours as needed for wheezing   fluticasone (FLONASE) 50 mcg/act nasal spray   No No   Si spray into each nostril daily   Patient not taking: Reported on 2019   fluticasone-vilanterol (BREO ELLIPTA) 200-25 MCG/INH inhaler 2019 at Unknown time  Yes Yes   Sig: Inhale 1 puff daily Rinse mouth after use     ibuprofen (MOTRIN) 600 mg tablet Past Week at Unknown time  No Yes   Sig: Take 1 tablet (600 mg total) by mouth every 6 (six) hours as needed for moderate pain or headaches   montelukast (SINGULAIR) 10 mg tablet 2019 at Unknown time  Yes Yes   Sig: Take 10 mg by mouth daily at bedtime      Facility-Administered Medications: None       Past Medical History:   Diagnosis Date    Asthma        History reviewed  No pertinent surgical history  History reviewed  No pertinent family history  I have reviewed and agree with the history as documented  Social History     Tobacco Use    Smoking status: Never Smoker    Smokeless tobacco: Never Used   Substance Use Topics    Alcohol use: Yes     Comment: rarely    Drug use: No        Review of Systems   Constitutional: Negative for activity change, chills and fever  HENT: Negative for congestion, rhinorrhea and sore throat  Eyes: Negative for photophobia and visual disturbance  Respiratory: Negative for cough, chest tightness, shortness of breath and wheezing  Cardiovascular: Negative for chest pain and palpitations  Gastrointestinal: Negative for abdominal pain, bowel incontinence, constipation, diarrhea, nausea and vomiting  No loss of bowel control   Genitourinary: Negative for bladder incontinence, difficulty urinating, enuresis, flank pain, frequency, hematuria and urgency  Musculoskeletal: Positive for back pain  Negative for gait problem, neck pain and neck stiffness  Skin: Negative for rash and wound  Neurological: Negative for dizziness, tremors, speech difficulty, weakness, light-headedness, numbness and headaches  Physical Exam  Physical Exam   Constitutional: She is oriented to person, place, and time  She appears well-developed and well-nourished  No distress  HENT:   Head: Normocephalic and atraumatic  Right Ear: External ear normal    Left Ear: External ear normal    Nose: Nose normal    Mouth/Throat: Oropharynx is clear and moist  No oropharyngeal exudate  Eyes: Pupils are equal, round, and reactive to light  Conjunctivae and EOM are normal    Neck: Normal range of motion  Neck supple  Cardiovascular: Normal rate, regular rhythm, normal heart sounds and intact distal pulses   Exam reveals no gallop and no friction rub  No murmur heard  Pulmonary/Chest: Effort normal and breath sounds normal  No respiratory distress  She has no wheezes  She has no rales  Abdominal: Soft  She exhibits no distension and no mass  There is no tenderness  There is no guarding  Musculoskeletal: She exhibits tenderness (TTP bilateral lumbar paraspinal musculature)  She exhibits no edema or deformity  Decreased ROM in flexion lumbar spine due to pain   Lymphadenopathy:     She has no cervical adenopathy  Neurological: She is alert and oriented to person, place, and time  She displays normal reflexes  No cranial nerve deficit or sensory deficit  She exhibits normal muscle tone  Coordination normal    Straight leg raise on L side elicits low back/posterior leg pain   Skin: Skin is warm and dry  Capillary refill takes less than 2 seconds  No rash noted  She is not diaphoretic  No erythema  No pallor  Psychiatric: She has a normal mood and affect  Her behavior is normal  Judgment and thought content normal    Nursing note and vitals reviewed        Vital Signs  ED Triage Vitals [07/26/19 1336]   Temperature Pulse Respirations Blood Pressure SpO2   98 1 °F (36 7 °C) 85 20 144/99 100 %      Temp src Heart Rate Source Patient Position - Orthostatic VS BP Location FiO2 (%)   -- -- -- -- --      Pain Score       --           Vitals:    07/26/19 1336   BP: 144/99   Pulse: 85         Visual Acuity      ED Medications  Medications   ketorolac (TORADOL) injection 15 mg (15 mg Intramuscular Given 7/26/19 1500)   methocarbamol (ROBAXIN) tablet 500 mg (500 mg Oral Given 7/26/19 1500)       Diagnostic Studies  Results Reviewed     Procedure Component Value Units Date/Time    Urine Microscopic [[id number]]  (Abnormal) Collected:  07/26/19 1442    Lab Status:  Final result Specimen:  Urine, Clean Catch Updated:  07/26/19 1457     RBC, UA Innumerable /hpf      WBC, UA None Seen /hpf      Epithelial Cells None Seen /hpf      Bacteria, UA None Seen /hpf      Hyaline Casts, UA None Seen /lpf     POCT pregnancy, urine [[id number]]  (Normal) Resulted:  07/26/19 1440    Lab Status:  Final result Updated:  07/26/19 1441     EXT PREG TEST UR (Ref: Negative) NEGATIVE     Control VALID    ED Urine Macroscopic [[id number]]  (Abnormal) Collected:  07/26/19 1442    Lab Status:  Final result Specimen:  Urine Updated:  07/26/19 1438     Color, UA Jinny     Clarity, UA Slightly Cloudy     pH, UA 5 5     Leukocytes, UA Negative     Nitrite, UA Negative     Protein, UA Negative mg/dl      Glucose, UA Negative mg/dl      Ketones, UA Negative mg/dl      Urobilinogen, UA 0 2 E U /dl      Bilirubin, UA Negative     Blood, UA Moderate     Specific Gravity, UA >=1 030    Narrative:       CLINITEK RESULT                 No orders to display              Procedures  Procedures       ED Course                               MDM  Number of Diagnoses or Management Options  Acute left-sided low back pain with left-sided sciatica:   Diagnosis management comments: Symptoms c/w L lumbar radiculopathy  No alarm symptoms at this time  Significant improvement in ED after toradol, robaxin  Blood in urine but pt is currently on period  Robaxin, naproxen at home PRN  F/u with ortho  Patient Progress  Patient progress: improved      Disposition  Final diagnoses:   Acute left-sided low back pain with left-sided sciatica     Time reflects when diagnosis was documented in both MDM as applicable and the Disposition within this note     Time User Action Codes Description Comment    7/26/2019  3:34 PM Milagro Spear Add [M54 42] Acute left-sided low back pain with left-sided sciatica       ED Disposition     ED Disposition Condition Date/Time Comment    Discharge Stable Fri Jul 26, 2019  3:33 PM Sanya  discharge to home/self care              Follow-up Information     Follow up With Specialties Details Why Contact Info Additional [facility] Schedule an appointment as soon as possible for a visit   [address] [phone]  [phone] [address] [place] [address], [phone]          Discharge Medication List as of 7/26/2019  3:35 PM      START taking these medications    Details   methocarbamol (ROBAXIN) 500 mg tablet Take 1 tablet (500 mg total) by mouth 2 (two) times a day, Starting Fri 7/26/2019, Print      naproxen (NAPROSYN) 500 mg tablet Take 1 tablet (500 mg total) by mouth 2 (two) times a day with meals, Starting Fri 7/26/2019, Print         CONTINUE these medications which have NOT CHANGED    Details   fluticasone-vilanterol (BREO ELLIPTA) 200-25 MCG/INH inhaler Inhale 1 puff daily Rinse mouth after use , Historical Med      ibuprofen (MOTRIN) 600 mg tablet Take 1 tablet (600 mg total) by mouth every 6 (six) hours as needed for moderate pain or headaches, Starting Sat 1/19/2019, Print      montelukast (SINGULAIR) 10 mg tablet Take 10 mg by mouth daily at bedtime, Historical Med      albuterol (PROVENTIL HFA,VENTOLIN HFA) 90 mcg/act inhaler Inhale 1 puff every 6 (six) hours as needed for wheezing, Historical Med      fluticasone (FLONASE) 50 mcg/act nasal spray 1 spray into each nostril daily, Starting Sat 1/19/2019, Print           No discharge procedures on file      ED Provider  Electronically Signed by           Sophy Tilley PA-C  07/26/19 9742

## 2019-07-26 NOTE — DISCHARGE INSTRUCTIONS
Please refer to the attached information for strict return instructions  If symptoms worsen or new symptoms develop please return to the ER

## 2021-09-14 ENCOUNTER — HOSPITAL ENCOUNTER (EMERGENCY)
Facility: HOSPITAL | Age: 41
Discharge: HOME | End: 2021-09-14
Attending: EMERGENCY MEDICINE | Admitting: EMERGENCY MEDICINE
Payer: COMMERCIAL

## 2021-09-14 ENCOUNTER — APPOINTMENT (EMERGENCY)
Dept: RADIOLOGY | Facility: HOSPITAL | Age: 41
End: 2021-09-14
Payer: COMMERCIAL

## 2021-09-14 VITALS
RESPIRATION RATE: 16 BRPM | WEIGHT: 175 LBS | BODY MASS INDEX: 28.12 KG/M2 | DIASTOLIC BLOOD PRESSURE: 64 MMHG | OXYGEN SATURATION: 98 % | SYSTOLIC BLOOD PRESSURE: 163 MMHG | HEART RATE: 57 BPM | HEIGHT: 66 IN | TEMPERATURE: 98.2 F

## 2021-09-14 DIAGNOSIS — R07.9 CHEST PAIN: Primary | ICD-10-CM

## 2021-09-14 DIAGNOSIS — K21.9 GERD (GASTROESOPHAGEAL REFLUX DISEASE): ICD-10-CM

## 2021-09-14 LAB
ALBUMIN SERPL BCP-MCNC: 3.6 G/DL (ref 3.5–5)
ALP SERPL-CCNC: 70 U/L (ref 46–116)
ALT SERPL W P-5'-P-CCNC: 12 U/L (ref 12–78)
ANION GAP SERPL CALCULATED.3IONS-SCNC: 2 MMOL/L (ref 4–13)
AST SERPL W P-5'-P-CCNC: 14 U/L (ref 5–45)
BASOPHILS # BLD AUTO: 0.08 THOUSANDS/ΜL (ref 0–0.1)
BASOPHILS NFR BLD AUTO: 1 % (ref 0–1)
BILIRUB SERPL-MCNC: 0.41 MG/DL (ref 0.2–1)
BUN SERPL-MCNC: 10 MG/DL (ref 5–25)
CALCIUM SERPL-MCNC: 8.7 MG/DL (ref 8.3–10.1)
CHLORIDE SERPL-SCNC: 107 MMOL/L (ref 100–108)
CO2 SERPL-SCNC: 29 MMOL/L (ref 21–32)
CREAT SERPL-MCNC: 0.7 MG/DL (ref 0.6–1.3)
EOSINOPHIL # BLD AUTO: 0.41 THOUSAND/ΜL (ref 0–0.61)
EOSINOPHIL NFR BLD AUTO: 5 % (ref 0–6)
ERYTHROCYTE [DISTWIDTH] IN BLOOD BY AUTOMATED COUNT: 15 % (ref 11.6–15.1)
GFR SERPL CREATININE-BSD FRML MDRD: 107 ML/MIN/1.73SQ M
GLUCOSE SERPL-MCNC: 120 MG/DL (ref 65–140)
HCT VFR BLD AUTO: 40.9 % (ref 34.8–46.1)
HGB BLD-MCNC: 11.9 G/DL (ref 11.5–15.4)
IMM GRANULOCYTES # BLD AUTO: 0.02 THOUSAND/UL (ref 0–0.2)
IMM GRANULOCYTES NFR BLD AUTO: 0 % (ref 0–2)
LYMPHOCYTES # BLD AUTO: 2.12 THOUSANDS/ΜL (ref 0.6–4.47)
LYMPHOCYTES NFR BLD AUTO: 24 % (ref 14–44)
MCH RBC QN AUTO: 23.4 PG (ref 26.8–34.3)
MCHC RBC AUTO-ENTMCNC: 29.1 G/DL (ref 31.4–37.4)
MCV RBC AUTO: 81 FL (ref 82–98)
MONOCYTES # BLD AUTO: 0.74 THOUSAND/ΜL (ref 0.17–1.22)
MONOCYTES NFR BLD AUTO: 8 % (ref 4–12)
NEUTROPHILS # BLD AUTO: 5.47 THOUSANDS/ΜL (ref 1.85–7.62)
NEUTS SEG NFR BLD AUTO: 62 % (ref 43–75)
NRBC BLD AUTO-RTO: 0 /100 WBCS
PLATELET # BLD AUTO: 299 THOUSANDS/UL (ref 149–390)
PMV BLD AUTO: 11.2 FL (ref 8.9–12.7)
POTASSIUM SERPL-SCNC: 4.3 MMOL/L (ref 3.5–5.3)
PROT SERPL-MCNC: 7.4 G/DL (ref 6.4–8.2)
RBC # BLD AUTO: 5.08 MILLION/UL (ref 3.81–5.12)
SODIUM SERPL-SCNC: 138 MMOL/L (ref 136–145)
TROPONIN I SERPL-MCNC: <0.02 NG/ML
TROPONIN I SERPL-MCNC: <0.02 NG/ML
WBC # BLD AUTO: 8.84 THOUSAND/UL (ref 4.31–10.16)

## 2021-09-14 PROCEDURE — 80053 COMPREHEN METABOLIC PANEL: CPT | Performed by: EMERGENCY MEDICINE

## 2021-09-14 PROCEDURE — 71045 X-RAY EXAM CHEST 1 VIEW: CPT

## 2021-09-14 PROCEDURE — 36415 COLL VENOUS BLD VENIPUNCTURE: CPT

## 2021-09-14 PROCEDURE — 99285 EMERGENCY DEPT VISIT HI MDM: CPT | Performed by: EMERGENCY MEDICINE

## 2021-09-14 PROCEDURE — 93005 ELECTROCARDIOGRAM TRACING: CPT

## 2021-09-14 PROCEDURE — 84484 ASSAY OF TROPONIN QUANT: CPT | Performed by: EMERGENCY MEDICINE

## 2021-09-14 PROCEDURE — 84484 ASSAY OF TROPONIN QUANT: CPT

## 2021-09-14 PROCEDURE — 85025 COMPLETE CBC W/AUTO DIFF WBC: CPT | Performed by: EMERGENCY MEDICINE

## 2021-09-14 PROCEDURE — 99285 EMERGENCY DEPT VISIT HI MDM: CPT

## 2021-09-14 RX ORDER — OMEPRAZOLE 20 MG/1
20 CAPSULE, DELAYED RELEASE ORAL DAILY
Qty: 30 CAPSULE | Refills: 0 | Status: SHIPPED | OUTPATIENT
Start: 2021-09-14

## 2021-09-14 NOTE — ED ATTENDING ATTESTATION
9/14/2021  IStiven MD, saw and evaluated the patient  I have discussed the patient with the resident/non-physician practitioner and agree with the resident's/non-physician practitioner's findings, Plan of Care, and MDM as documented in the resident's/non-physician practitioner's note, except where noted  All available labs and Radiology studies were reviewed  I was present for key portions of any procedure(s) performed by the resident/non-physician practitioner and I was immediately available to provide assistance  At this point I agree with the current assessment done in the Emergency Department  I have conducted an independent evaluation of this patient a history and physical is as follows:    57-year-old woman presenting with complaint of chest pain  Patient states this 1st started about 1 week ago with exertion, described as central chest pressure radiating up shoulders and back bilaterally, there has also been some abdominal discomfort  Patient has been continuing to have episodes of this lasting about 30 minutes at a time, sometimes occurring with exertion but not always  Most recent episode was 2 hours prior to presentation and patient currently asymptomatic  She is awake and alert no acute distress  Head atraumatic normocephalic  Oropharynx clear  Neck is supple  Heart regular rate rhythm, no murmurs rubs or gallops  Lungs clear to auscultation bilaterally  Abdomen soft, nontender, nondistended  No gross focal neuro deficit  Initial EKG with no ischemic changes and troponin negative, heart score 2  Delta with no significant change  Will discharge patient with instructions for follow-up  Given that this could also be more GI related, will start the patient on a PPI to see if this helps        ED Course         Critical Care Time  Procedures

## 2021-09-14 NOTE — DISCHARGE INSTRUCTIONS
You were seen in the emergency department today for episodes of chest pressure that went to your back/shoulders  We looked at your cardiac activity with ECG's (x2) and looked for cardiac enzymes in your blood called Troponin (x2)  These results were reassuring, however your symptoms could still be concerning for Angina  Please followup with your PCP regarding your symptoms  It is also possible that your symptoms could be caused by acid from your stomach in your esophagus  I am sending you with a prescription for Prilosec (a PPI) please take the prescription to your pharmacy and take 1 pill per day until you are able to followup with your primary care doctor  I have included contact information for [facility] which would be a great choice for establishing primary care here in the [facility]  Also please call cardiology for followup of your concerning symptoms

## 2021-09-14 NOTE — ED PROVIDER NOTES
History  Chief Complaint   Patient presents with    Chest Pain     Chest pain since yesterday  Pain radiates into shoulders  Had pain last month, it was related to asthma, but this feels different  40-year-old female presenting with chief complaint of chest pressure  Patient states that she has been having episodic chest pressure over the last week  First episodes occurred approximately 1 week ago at work when she was exerting herself and noticed that she was feeling chest pressure that radiated up to her bilateral shoulders and to her back  She states that she had 2 episodes approximately 1 week ago that each lasted approximately 30 minutes and slowly resolved on their own  Associated symptoms were some stomach discomfort, and a mild headache  She noted that yesterday she began having more frequent episodes of this chest pressure similar in nature to before  These episodes also lasted approximately 30 minutes, and resolved on their own  Most recent episode was approximately 2 hours ago  She is denying shortness of breath, nausea, vomiting, diarrhea, fevers, chills, night sweats, abdominal pain  Patient denies past medical diagnoses other than asthma  Patient does not take medications every day for any reason    She is denying ever experiencing symptoms similar to this in the past       Chest Pain  Pain location:  Substernal area  Pain quality: pressure    Pain radiates to:  L shoulder, R shoulder and mid back  Pain radiates to the back: yes    Pain severity:  Moderate  Onset quality:  Gradual  Duration:  1 week  Timing:  Intermittent  Progression:  Waxing and waning  Chronicity:  New  Context: not breathing and not eating    Context comment:  Initially exertional (1wk ago) now at rest  Associated symptoms: abdominal pain, back pain and headache    Associated symptoms: no cough, no fever, no palpitations, no shortness of breath and not vomiting        Prior to Admission Medications   Prescriptions Last Dose Informant Patient Reported? Taking? albuterol (PROVENTIL HFA,VENTOLIN HFA) 90 mcg/act inhaler   Yes Yes   Sig: Inhale 1 puff every 6 (six) hours as needed for wheezing   montelukast (SINGULAIR) 10 mg tablet   Yes Yes   Sig: Take 10 mg by mouth daily at bedtime      Facility-Administered Medications: None       Past Medical History:   Diagnosis Date    Asthma        History reviewed  No pertinent surgical history  History reviewed  No pertinent family history  I have reviewed and agree with the history as documented  E-Cigarette/Vaping     E-Cigarette/Vaping Substances     Social History     Tobacco Use    Smoking status: Never Smoker    Smokeless tobacco: Never Used   Substance Use Topics    Alcohol use: Yes     Comment: rarely    Drug use: No        Review of Systems   Constitutional: Negative for chills and fever  HENT: Negative for congestion and sore throat  Eyes: Negative for photophobia and visual disturbance  Respiratory: Positive for chest tightness  Negative for cough and shortness of breath  Cardiovascular: Positive for chest pain  Negative for palpitations  Gastrointestinal: Positive for abdominal pain  Negative for vomiting  Genitourinary: Negative for dysuria and hematuria  Musculoskeletal: Positive for back pain  Negative for arthralgias  Skin: Negative for color change and rash  Allergic/Immunologic: Negative for immunocompromised state  Neurological: Positive for headaches  Negative for seizures and syncope  Hematological: Negative for adenopathy  Does not bruise/bleed easily  All other systems reviewed and are negative        Physical Exam  ED Triage Vitals   Temperature Pulse Respirations Blood Pressure SpO2   09/14/21 1141 09/14/21 1141 09/14/21 1141 09/14/21 1141 09/14/21 1141   98 2 °F (36 8 °C) 76 16 165/97 100 %      Temp Source Heart Rate Source Patient Position - Orthostatic VS BP Location FiO2 (%)   09/14/21 1141 09/14/21 1230 09/14/21 1230 09/14/21 1230 --   Oral Monitor Lying Right arm       Pain Score       09/14/21 1220       8             Orthostatic Vital Signs  Vitals:    09/14/21 1141 09/14/21 1230 09/14/21 1400   BP: 165/97 163/67 163/64   Pulse: 76 86 57   Patient Position - Orthostatic VS:  Lying Lying       Physical Exam  Vitals and nursing note reviewed  Constitutional:       General: She is not in acute distress  Appearance: She is well-developed  HENT:      Head: Normocephalic and atraumatic  Eyes:      Extraocular Movements: Extraocular movements intact  Conjunctiva/sclera: Conjunctivae normal       Pupils: Pupils are equal, round, and reactive to light  Neck:      Trachea: No tracheal deviation  Cardiovascular:      Rate and Rhythm: Normal rate and regular rhythm  No extrasystoles are present  Chest Wall: PMI is not displaced  Pulses:           Radial pulses are 2+ on the right side and 2+ on the left side  Dorsalis pedis pulses are 2+ on the right side and 2+ on the left side  Heart sounds: Normal heart sounds  Heart sounds not distant  No murmur heard  No friction rub  No gallop  Pulmonary:      Effort: Pulmonary effort is normal  No respiratory distress  Breath sounds: Normal breath sounds  No decreased breath sounds or wheezing  Chest:      Chest wall: No edema  There is no dullness to percussion  Abdominal:      Palpations: Abdomen is soft  Tenderness: There is no abdominal tenderness  There is no guarding or rebound  Musculoskeletal:      Cervical back: Neck supple  Right lower leg: No tenderness  No edema  Left lower leg: No tenderness  No edema  Skin:     General: Skin is warm and dry  Capillary Refill: Capillary refill takes less than 2 seconds  Neurological:      General: No focal deficit present  Mental Status: She is alert     Psychiatric:         Mood and Affect: Mood normal          Behavior: Behavior normal          ED Medications  Medications - No data to display    Diagnostic Studies  Results Reviewed     Procedure Component Value Units Date/Time    Troponin I [[id number]]  (Normal) Collected: 09/14/21 1431    Lab Status: Final result Specimen: Blood from Arm, Right Updated: 09/14/21 1501     Troponin I <0 02 ng/mL     Comprehensive metabolic panel [[id number]]  (Abnormal) Collected: 09/14/21 1214    Lab Status: Final result Specimen: Blood from Arm, Right Updated: 09/14/21 1258     Sodium 138 mmol/L      Potassium 4 3 mmol/L      Chloride 107 mmol/L      CO2 29 mmol/L      ANION GAP 2 mmol/L      BUN 10 mg/dL      Creatinine 0 70 mg/dL      Glucose 120 mg/dL      Calcium 8 7 mg/dL      AST 14 U/L      ALT 12 U/L      Alkaline Phosphatase 70 U/L      Total Protein 7 4 g/dL      Albumin 3 6 g/dL      Total Bilirubin 0 41 mg/dL      eGFR 107 ml/min/1 73sq m     Narrative:      [facility] guidelines for Chronic Kidney Disease (CKD):     Stage 1 with normal or high GFR (GFR > 90 mL/min/1 73 square meters)    Stage 2 Mild CKD (GFR = 60-89 mL/min/1 73 square meters)    Stage 3A Moderate CKD (GFR = 45-59 mL/min/1 73 square meters)    Stage 3B Moderate CKD (GFR = 30-44 mL/min/1 73 square meters)    Stage 4 Severe CKD (GFR = 15-29 mL/min/1 73 square meters)    Stage 5 End Stage CKD (GFR <15 mL/min/1 73 square meters)  Note: GFR calculation is accurate only with a steady state creatinine    Troponin I [[id number]]  (Normal) Collected: 09/14/21 1214    Lab Status: Final result Specimen: Blood from Arm, Right Updated: 09/14/21 1242     Troponin I <0 02 ng/mL     CBC and differential [[id number]]  (Abnormal) Collected: 09/14/21 1214    Lab Status: Final result Specimen: Blood from Arm, Right Updated: 09/14/21 1228     WBC 8 84 Thousand/uL      RBC 5 08 Million/uL      Hemoglobin 11 9 g/dL      Hematocrit 40 9 %      MCV 81 fL      MCH 23 4 pg      MCHC 29 1 g/dL      RDW 15 0 %      MPV 11 2 fL      Platelets 788 Thousands/uL      nRBC 0 /100 WBCs      Neutrophils Relative 62 %      Immat GRANS % 0 %      Lymphocytes Relative 24 %      Monocytes Relative 8 %      Eosinophils Relative 5 %      Basophils Relative 1 %      Neutrophils Absolute 5 47 Thousands/µL      Immature Grans Absolute 0 02 Thousand/uL      Lymphocytes Absolute 2 12 Thousands/µL      Monocytes Absolute 0 74 Thousand/µL      Eosinophils Absolute 0 41 Thousand/µL      Basophils Absolute 0 08 Thousands/µL                  XR chest 1 view portable   Final Result by Vinita Mccoy MD (09/14 1613)      No acute cardiopulmonary disease  Workstation performed: [id number]               Procedures  ECG 12 Lead Documentation Only    Date/Time: 9/14/2021 1:46 PM  Performed by: Zoran Navarro MD  Authorized by: Zoran Navarro MD     Indications / Diagnosis:  Chest Pressure  Patient location:  ED  Previous ECG:     Previous ECG:  Compared to current    Comparison ECG info:  5/22/19    Similarity:  No change    Comparison to cardiac monitor: Yes    Interpretation:     Interpretation: non-specific    Rate:     ECG rate:  71  Rhythm:     Rhythm: sinus rhythm    Ectopy:     Ectopy: none    QRS:     QRS axis:  Right    QRS intervals:  Normal  Conduction:     Conduction: normal    ST segments:     ST segments:  Normal          ED Course             HEART Risk Score      Most Recent Value   Heart Score Risk Calculator   History  1 Filed at: 09/16/2021 1818   ECG  1 Filed at: 09/16/2021 1818   Age  0 Filed at: 09/16/2021 1818   Risk Factors  0 Filed at: 09/16/2021 1818   Troponin  0 Filed at: 09/16/2021 1818   HEART Score  2 Filed at: 09/16/2021 1818                      SBIRT 22yo+      Most Recent Value   SBIRT (23 yo +)   In order to provide better care to our patients, we are screening all of our patients for alcohol and drug use  Would it be okay to ask you these screening questions?   No Filed at: 09/14/2021 1220                [facility]  Number of Diagnoses or Management Options  Chest pain  GERD (gastroesophageal reflux disease)  Diagnosis management comments: Episodic Chest Pressure  - Patient has been having multiple recurrent episodes of chest pressure associated with shoulder/back pain   - No n/v/d/f/c  - No cardiac history  - No cardiac risk factors  A/P  - ECG + Troponin + Chest X ray  - Delta ECG + Trop if initial is WNL    REASSESSMENT/PLAN:  On re-exam patient remains stable  Her ECG remained grossly unchanged from prior  Her troponin is still negative  At this point she is a low risk for acute coronary syndrome in the next 30 days according to heart score  I let the patient know that while she does state in the low risk category, but does not mean he is at no risk  She needs to be vigilant for new or concerning symptoms especially worsening of her chest discomfort, new features such as left arm tingling, radiation of the neck, nausea, diaphoresis, or shortness of breath  Patient expresses understanding of these return precautions, and also agrees follow-up with cardiology and her primary care physician if she does not have any of the symptoms  I did send the patient with a prescription for a PPI to treat possible GERD  Disposition  Final diagnoses:   Chest pain   GERD (gastroesophageal reflux disease)     Time reflects when diagnosis was documented in both MDM as applicable and the Disposition within this note     Time User Action Codes Description Comment    9/14/2021  2:43 PM Aquilla Merlin Add [R07 9] Chest pain     9/14/2021  2:44 PM Aquilla Merlin Add [K21 9] GERD (gastroesophageal reflux disease)       ED Disposition     ED Disposition Condition Date/Time Comment    Discharge Stable Tue Sep 14, 2021  2:43 PM Joelle Brown discharge to home/self care              Follow-up Information     Follow up With Specialties Details Why Contact Info Additional [facility] Schedule an appointment as soon as possible for a visit in 1 week  [address] [phone]  [address] [facility]  [facility] [address] Coral Sanchez, [address] [place] [address], [phone] [address] [facility] Call in 2 days Call to establish care with a primary care provider [address], [address] [facility] [phone]  [address], [address], [address] [place], [place], [address]          Discharge Medication List as of 9/14/2021  3:05 PM      START taking these medications    Details   omeprazole (PriLOSEC) 20 mg delayed release capsule Take 1 capsule (20 mg total) by mouth daily, Starting Tue 9/14/2021, Print         CONTINUE these medications which have NOT CHANGED    Details   albuterol (PROVENTIL HFA,VENTOLIN HFA) 90 mcg/act inhaler Inhale 1 puff every 6 (six) hours as needed for wheezing, Historical Med      montelukast (SINGULAIR) 10 mg tablet Take 10 mg by mouth daily at bedtime, Historical Med           No discharge procedures on file  PDMP Review     None           ED Provider  Attending physically available and evaluated Lolly Madelyn BRUNER managed the patient along with the ED Attending      Electronically Signed by         Ana Bridges MD  09/16/21 0735

## 2021-09-14 NOTE — ED PROCEDURE NOTE
Procedure  POC Cardiac US    Date/Time: 9/14/2021 1:30 PM  Performed by: Bassem Mejia DO  Authorized by: Bassem Mejia DO     Patient location:  ED  Procedure details:     Exam Type:  Diagnostic    Indications comment:  Back pain    Assessment / Evaluation for: cardiac function and pericardial effusion      Exam Type: initial exam      Image quality: diagnostic      Image availability:  Images available in PACS  Patient Details:     Cardiac Rhythm:  Regular  Cardiac findings:     Echo technique: limited 2D      Views obtained: parasternal long axis and parasternal short axis      Pericardial effusion: absent      Tamponade physiology: absent      Wall motion: normal      LV systolic function: normal      RV dilation: none    POC AAA US    Date/Time: 9/14/2021 1:32 PM  Performed by: Bassem Mejia DO  Authorized by: Bassem Mejia DO     Patient location:  ED  Procedure details:     Exam Type:  Diagnostic    Indications: back pain      Views Obtained:  Transverse proximal, transverse mid view, transverse distal view and sagittal (longitudinal) view    Image quality: diagnostic      Image availability:  Images available in PACS  Findings:     Abdominal Aorta Findings: normal      Maximal Aorta diameter (cm):  2    Intra-abdominal fluid: not identified    Interpretation:     Aortic ultrasound impression: aorta normal                       Bassem Mejia DO  09/14/21 1332

## 2021-09-15 LAB
ATRIAL RATE: 60 BPM
ATRIAL RATE: 71 BPM
P AXIS: 53 DEGREES
P AXIS: 63 DEGREES
PR INTERVAL: 138 MS
PR INTERVAL: 148 MS
QRS AXIS: -10 DEGREES
QRS AXIS: 97 DEGREES
QRSD INTERVAL: 78 MS
QRSD INTERVAL: 80 MS
QT INTERVAL: 398 MS
QT INTERVAL: 418 MS
QTC INTERVAL: 418 MS
QTC INTERVAL: 432 MS
T WAVE AXIS: 13 DEGREES
T WAVE AXIS: 7 DEGREES
VENTRICULAR RATE: 60 BPM
VENTRICULAR RATE: 71 BPM

## 2021-09-15 PROCEDURE — 93010 ELECTROCARDIOGRAM REPORT: CPT | Performed by: INTERNAL MEDICINE

## 2022-04-20 ENCOUNTER — APPOINTMENT (EMERGENCY)
Dept: RADIOLOGY | Facility: HOSPITAL | Age: 42
End: 2022-04-20
Payer: COMMERCIAL

## 2022-04-20 ENCOUNTER — HOSPITAL ENCOUNTER (EMERGENCY)
Facility: HOSPITAL | Age: 42
Discharge: HOME | End: 2022-04-20
Attending: EMERGENCY MEDICINE
Payer: COMMERCIAL

## 2022-04-20 VITALS
DIASTOLIC BLOOD PRESSURE: 84 MMHG | TEMPERATURE: 98.5 F | OXYGEN SATURATION: 98 % | RESPIRATION RATE: 18 BRPM | SYSTOLIC BLOOD PRESSURE: 140 MMHG | HEART RATE: 99 BPM

## 2022-04-20 DIAGNOSIS — J45.909 ASTHMA: ICD-10-CM

## 2022-04-20 DIAGNOSIS — J10.1 INFLUENZA A: Primary | ICD-10-CM

## 2022-04-20 LAB
FLUAV RNA RESP QL NAA+PROBE: POSITIVE
FLUBV RNA RESP QL NAA+PROBE: NEGATIVE
RSV RNA RESP QL NAA+PROBE: NEGATIVE
SARS-COV-2 RNA RESP QL NAA+PROBE: NEGATIVE

## 2022-04-20 PROCEDURE — 71046 X-RAY EXAM CHEST 2 VIEWS: CPT

## 2022-04-20 PROCEDURE — 94640 AIRWAY INHALATION TREATMENT: CPT

## 2022-04-20 PROCEDURE — 96372 THER/PROPH/DIAG INJ SC/IM: CPT

## 2022-04-20 PROCEDURE — 99284 EMERGENCY DEPT VISIT MOD MDM: CPT | Performed by: EMERGENCY MEDICINE

## 2022-04-20 PROCEDURE — 99283 EMERGENCY DEPT VISIT LOW MDM: CPT

## 2022-04-20 PROCEDURE — 0241U HB NFCT DS VIR RESP RNA 4 TRGT: CPT | Performed by: EMERGENCY MEDICINE

## 2022-04-20 RX ORDER — ALBUTEROL SULFATE 90 UG/1
2 AEROSOL, METERED RESPIRATORY (INHALATION) ONCE
Status: COMPLETED | OUTPATIENT
Start: 2022-04-20 | End: 2022-04-20

## 2022-04-20 RX ORDER — KETOROLAC TROMETHAMINE 30 MG/ML
30 INJECTION, SOLUTION INTRAMUSCULAR; INTRAVENOUS ONCE
Status: COMPLETED | OUTPATIENT
Start: 2022-04-20 | End: 2022-04-20

## 2022-04-20 RX ORDER — PREDNISONE 20 MG/1
40 TABLET ORAL ONCE
Status: COMPLETED | OUTPATIENT
Start: 2022-04-20 | End: 2022-04-20

## 2022-04-20 RX ORDER — PREDNISONE 50 MG/1
50 TABLET ORAL DAILY
Qty: 5 TABLET | Refills: 0 | Status: SHIPPED | OUTPATIENT
Start: 2022-04-20 | End: 2022-04-25

## 2022-04-20 RX ORDER — ALBUTEROL SULFATE 2.5 MG/3ML
5 SOLUTION RESPIRATORY (INHALATION) ONCE
Status: COMPLETED | OUTPATIENT
Start: 2022-04-20 | End: 2022-04-20

## 2022-04-20 RX ORDER — IPRATROPIUM BROMIDE AND ALBUTEROL SULFATE 2.5; .5 MG/3ML; MG/3ML
3 SOLUTION RESPIRATORY (INHALATION) ONCE
Status: COMPLETED | OUTPATIENT
Start: 2022-04-20 | End: 2022-04-20

## 2022-04-20 RX ADMIN — PREDNISONE 40 MG: 20 TABLET ORAL at 10:57

## 2022-04-20 RX ADMIN — IPRATROPIUM BROMIDE AND ALBUTEROL SULFATE 3 ML: .5; 3 SOLUTION RESPIRATORY (INHALATION) at 10:59

## 2022-04-20 RX ADMIN — ALBUTEROL SULFATE 5 MG: 2.5 SOLUTION RESPIRATORY (INHALATION) at 10:13

## 2022-04-20 RX ADMIN — IPRATROPIUM BROMIDE AND ALBUTEROL SULFATE 3 ML: 2.5; .5 SOLUTION RESPIRATORY (INHALATION) at 11:52

## 2022-04-20 RX ADMIN — ALBUTEROL SULFATE 2 PUFF: 90 AEROSOL, METERED RESPIRATORY (INHALATION) at 12:23

## 2022-04-20 RX ADMIN — IPRATROPIUM BROMIDE 0.5 MG: 0.5 SOLUTION RESPIRATORY (INHALATION) at 10:13

## 2022-04-20 RX ADMIN — KETOROLAC TROMETHAMINE 30 MG: 30 INJECTION, SOLUTION INTRAMUSCULAR at 10:57

## 2022-04-20 NOTE — ED PROVIDER NOTES
History  Chief Complaint   Patient presents with    Nasal Congestion     With cough - since Sunday night  Hx asthma  Felt hot  75-year-old female with asthma presents to the ED for evaluation of flu-like symptoms  Patient reports on Saturday started with headache, nasal congestion, cough, shortness of breath, subjective fevers, and myalgias  She has been using her albuterol inhaler and using Flonase without relief  She denies any sore throat or difficulty swallowing  No chest pain  She does have a nonproductive cough  No chest pain  No abdominal pain, nausea or vomiting  No known sick contact  She did receive 2 doses of the COVID-19 vaccine, but did not receive her flu vaccine  No leg pain or swelling  No rashes  History taken utilizing           Prior to Admission Medications   Prescriptions Last Dose Informant Patient Reported? Taking? albuterol (PROVENTIL HFA,VENTOLIN HFA) 90 mcg/act inhaler   Yes No   Sig: Inhale 1 puff every 6 (six) hours as needed for wheezing   montelukast (SINGULAIR) 10 mg tablet   Yes No   Sig: Take 10 mg by mouth daily at bedtime   omeprazole (PriLOSEC) 20 mg delayed release capsule   No No   Sig: Take 1 capsule (20 mg total) by mouth daily      Facility-Administered Medications: None       Past Medical History:   Diagnosis Date    Asthma        No past surgical history on file  No family history on file  I have reviewed and agree with the history as documented  E-Cigarette/Vaping     E-Cigarette/Vaping Substances     Social History     Tobacco Use    Smoking status: Never Smoker    Smokeless tobacco: Never Used   Substance Use Topics    Alcohol use: Yes     Comment: rarely    Drug use: No        Review of Systems   Constitutional: Positive for fever  Negative for chills  HENT: Positive for congestion  Negative for ear pain and sore throat  Eyes: Negative for pain and visual disturbance     Respiratory: Positive for cough and shortness of breath  Cardiovascular: Negative for chest pain and palpitations  Gastrointestinal: Negative for abdominal pain and vomiting  Genitourinary: Negative for dysuria and hematuria  Musculoskeletal: Positive for myalgias  Negative for arthralgias and back pain  Skin: Negative for color change and rash  Neurological: Negative for seizures and syncope  All other systems reviewed and are negative  Physical Exam  ED Triage Vitals [04/20/22 0953]   Temperature Pulse Respirations Blood Pressure SpO2   98 5 °F (36 9 °C) 99 18 140/84 98 %      Temp Source Heart Rate Source Patient Position - Orthostatic VS BP Location FiO2 (%)   Oral Monitor Sitting Right arm --      Pain Score       --             Orthostatic Vital Signs  Vitals:    04/20/22 0953   BP: 140/84   Pulse: 99   Patient Position - Orthostatic VS: Sitting       Physical Exam  Vitals and nursing note reviewed  Constitutional:       General: She is not in acute distress  Appearance: She is well-developed  HENT:      Head: Normocephalic and atraumatic  Right Ear: External ear normal       Left Ear: External ear normal       Nose: Nose normal       Mouth/Throat:      Mouth: Mucous membranes are moist    Eyes:      Extraocular Movements: Extraocular movements intact  Conjunctiva/sclera: Conjunctivae normal       Pupils: Pupils are equal, round, and reactive to light  Cardiovascular:      Rate and Rhythm: Normal rate and regular rhythm  Heart sounds: No murmur heard  Pulmonary:      Effort: Pulmonary effort is normal  No respiratory distress  Breath sounds: Wheezing present  Comments: Breathing treatment and process  Abdominal:      General: Abdomen is flat  Bowel sounds are normal       Palpations: Abdomen is soft  Tenderness: There is no abdominal tenderness  There is no right CVA tenderness, left CVA tenderness, guarding or rebound  Musculoskeletal:         General: No deformity  Normal range of motion  Cervical back: Normal range of motion and neck supple  Skin:     General: Skin is warm and dry  Capillary Refill: Capillary refill takes less than 2 seconds  Neurological:      General: No focal deficit present  Mental Status: She is alert and oriented to person, place, and time  Psychiatric:         Mood and Affect: Mood normal          Behavior: Behavior normal          ED Medications  Medications   albuterol inhalation solution 5 mg (5 mg Nebulization Given 4/20/22 1013)     And   ipratropium (ATROVENT) 0 02 % inhalation solution 0 5 mg (0 5 mg Nebulization Given 4/20/22 1013)   ipratropium-albuterol (DUO-NEB) 0 5-2 5 mg/3 mL inhalation solution 3 mL (3 mL Nebulization Given 4/20/22 1059)   predniSONE tablet 40 mg (40 mg Oral Given 4/20/22 1057)   ketorolac (TORADOL) injection 30 mg (30 mg Intramuscular Given 4/20/22 1057)   ipratropium-albuterol (DUO-NEB) 0 5-2 5 mg/3 mL inhalation solution 3 mL (3 mL Nebulization Given 4/20/22 1152)   albuterol (PROVENTIL HFA,VENTOLIN HFA) inhaler 2 puff (2 puffs Inhalation Given 4/20/22 1223)       Diagnostic Studies  Results Reviewed     Procedure Component Value Units Date/Time    COVID/FLU/RSV [[id number]]  (Abnormal) Collected: 04/20/22 1056    Lab Status: Final result Specimen: Nares from Nose Updated: 04/20/22 1204     SARS-CoV-2 Negative     INFLUENZA A PCR Positive     INFLUENZA B PCR Negative     RSV PCR Negative    Narrative:      FOR PEDIATRIC PATIENTS - copy/paste COVID Guidelines URL to browser: [email or web address]    SARS-CoV-2 assay is a Nucleic Acid Amplification assay intended for the  qualitative detection of nucleic acid from SARS-CoV-2 in nasopharyngeal  swabs  Results are for the presumptive identification of SARS-CoV-2 RNA      Positive results are indicative of infection with SARS-CoV-2, the virus  causing COVID-19, but do not rule out bacterial infection or co-infection  with other viruses  Laboratories within the [facility] and its  territories are required to report all positive results to the appropriate  public health authorities  Negative results do not preclude SARS-CoV-2  infection and should not be used as the sole basis for treatment or other  patient management decisions  Negative results must be combined with  clinical observations, patient history, and epidemiological information  This test has not been FDA cleared or approved  This test has been authorized by FDA under an Emergency Use Authorization  (EUA)  This test is only authorized for the duration of time the  declaration that circumstances exist justifying the authorization of the  emergency use of an in vitro diagnostic tests for detection of SARS-CoV-2  virus and/or diagnosis of COVID-19 infection under section 564(b)(1) of  the Act, 21 U  S C  611RZQ-3(D)(5), unless the authorization is terminated  or revoked sooner  The test has been validated but independent review by FDA  and CLIA is pending  Test performed using [employer] Gene[employer]pert: This RT-PCR assay targets N2,  a region unique to SARS-CoV-2  A conserved region in the E-gene was chosen  for pan-Sarbecovirus detection which includes SARS-CoV-2  XR chest 2 views   ED Interpretation by Antoinette Richardson MD (04/20 1052)   No acute cardiopulmonary abnormality      Final Result by Dejon Thomas MD (04/20 1232)      No acute cardiopulmonary disease  Findings are stable            Workstation performed: [id number]               Procedures  Procedures      ED Course  ED Course as of 04/20/22 1511   Wed Apr 20, 2022   1205 INFLU A PCR(!): Positive                                       MDM  Number of Diagnoses or Management Options  Asthma  Influenza A  Diagnosis management comments: 49-year-old female with asthma presents the ED for evaluation of flu-like illness  On exam she is well appearing no acute distress    She does have bilateral wheezing  Will swab for COVID-19, RSV, and influenza  Will get chest x-ray to evaluate for possible pneumonia  Will treat with DuoNeb, Toradol and steroids  Chest x-ray was negative for pneumonia  Patient tested positive for influenza A  Her symptoms improved following medications  She will be discharged home with prescriptions for prednisone  She was advised to use her albuterol inhaler as needed  She was given strict return precautions  Disposition  Final diagnoses:   Influenza A   Asthma     Time reflects when diagnosis was documented in both MDM as applicable and the Disposition within this note     Time User Action Codes Description Comment    4/20/2022 12:07 PM Check, Kathy Moraes [J10 [id number]] Influenza A     4/20/2022 12:07 PM Check, Su Jernigan [[id number]] Asthma       ED Disposition     ED Disposition Condition Date/Time Comment    Discharge Stable Wed Apr 20, 2022 12:09 PM Tierney Tamez discharge to home/self care              Follow-up Information     Follow up With Specialties Details Why Contact Info Additional [address] Emergency Department Emergency Medicine  If symptoms worsen [address]  [address] [facility] 64 [facility] Emergency Department, [address] [place] [address] 108          Discharge Medication List as of 4/20/2022 12:10 PM      START taking these medications    Details   predniSONE 50 mg tablet Take 1 tablet (50 mg total) by mouth daily for 5 days, Starting Wed 4/20/2022, Until Mon 4/25/2022, Normal         CONTINUE these medications which have NOT CHANGED    Details   albuterol (PROVENTIL HFA,VENTOLIN HFA) 90 mcg/act inhaler Inhale 1 puff every 6 (six) hours as needed for wheezing, Historical Med      montelukast (SINGULAIR) 10 mg tablet Take 10 mg by mouth daily at bedtime, Historical Med      omeprazole (PriLOSEC) 20 mg delayed release capsule Take 1 capsule (20 mg total) by mouth daily, Starting Tue 9/14/2021, Print           No discharge procedures on file  PDMP Review     None           ED Provider  Attending physically available and evaluated Reagan Suzette BRUNER managed the patient along with the ED Attending      Electronically Signed by         Juan Manuel Benitez MD  04/20/22 0835

## 2022-04-20 NOTE — DISCHARGE INSTRUCTIONS
Continue to use albuterol as needed for shortness of breath and wheezing  Take prednisone 1 tablet daily for 5 days    Recommend Mucinex cold and flu    You may also take ibuprofen as needed for fever and body aches

## 2022-04-22 NOTE — ED ATTENDING ATTESTATION
4/20/2022  ILorelei DO, saw and evaluated the patient  I have discussed the patient with the resident/non-physician practitioner and agree with the resident's/non-physician practitioner's findings, Plan of Care, and MDM as documented in the resident's/non-physician practitioner's note, except where noted  All available labs and Radiology studies were reviewed  I was present for key portions of any procedure(s) performed by the resident/non-physician practitioner and I was immediately available to provide assistance  At this point I agree with the current assessment done in the Emergency Department  I have conducted an independent evaluation of this patient a history and physical is as follows:    49-year-old female presents for cough flu-like symptoms  Onset was a few days ago  Myalgias shortness of breath nasal congestion  Flonase without relief  She is vaccinated against COVID not vaccinated is to flu has a normal exam other than rhinorrhea and nasal bogginess  Posterior pharynx normal   Normal vitals    Plan symptomatic treatment check for COVID    ED Course         Critical Care Time  Procedures

## 2022-07-30 ENCOUNTER — HOSPITAL ENCOUNTER (EMERGENCY)
Facility: HOSPITAL | Age: 42
Discharge: HOME | End: 2022-07-31
Attending: EMERGENCY MEDICINE
Payer: COMMERCIAL

## 2022-07-30 VITALS
TEMPERATURE: 97.7 F | DIASTOLIC BLOOD PRESSURE: 95 MMHG | RESPIRATION RATE: 16 BRPM | OXYGEN SATURATION: 100 % | SYSTOLIC BLOOD PRESSURE: 140 MMHG | HEART RATE: 65 BPM

## 2022-07-30 DIAGNOSIS — K21.9 GERD (GASTROESOPHAGEAL REFLUX DISEASE): Primary | ICD-10-CM

## 2022-07-30 DIAGNOSIS — R10.9 ABDOMINAL PAIN: ICD-10-CM

## 2022-07-30 LAB
ALBUMIN SERPL BCP-MCNC: 3.8 G/DL (ref 3.5–5)
ALP SERPL-CCNC: 79 U/L (ref 46–116)
ALT SERPL W P-5'-P-CCNC: 13 U/L (ref 12–78)
ANION GAP SERPL CALCULATED.3IONS-SCNC: 2 MMOL/L (ref 4–13)
AST SERPL W P-5'-P-CCNC: 19 U/L (ref 5–45)
BASOPHILS # BLD AUTO: 0.11 THOUSANDS/ΜL (ref 0–0.1)
BASOPHILS NFR BLD AUTO: 1 % (ref 0–1)
BILIRUB SERPL-MCNC: 0.23 MG/DL (ref 0.2–1)
BILIRUB UR QL STRIP: NEGATIVE
BUN SERPL-MCNC: 15 MG/DL (ref 5–25)
CALCIUM SERPL-MCNC: 9.4 MG/DL (ref 8.3–10.1)
CHLORIDE SERPL-SCNC: 111 MMOL/L (ref 96–108)
CLARITY UR: CLEAR
CO2 SERPL-SCNC: 26 MMOL/L (ref 21–32)
COLOR UR: COLORLESS
CREAT SERPL-MCNC: 0.9 MG/DL (ref 0.6–1.3)
EOSINOPHIL # BLD AUTO: 0.55 THOUSAND/ΜL (ref 0–0.61)
EOSINOPHIL NFR BLD AUTO: 5 % (ref 0–6)
ERYTHROCYTE [DISTWIDTH] IN BLOOD BY AUTOMATED COUNT: 15.8 % (ref 11.6–15.1)
EXT PREG TEST URINE: NEGATIVE
EXT. CONTROL ED NAV: NORMAL
GFR SERPL CREATININE-BSD FRML MDRD: 78 ML/MIN/1.73SQ M
GLUCOSE SERPL-MCNC: 94 MG/DL (ref 65–140)
GLUCOSE UR STRIP-MCNC: NEGATIVE MG/DL
HCT VFR BLD AUTO: 36.6 % (ref 34.8–46.1)
HGB BLD-MCNC: 10.1 G/DL (ref 11.5–15.4)
HGB UR QL STRIP.AUTO: NEGATIVE
IMM GRANULOCYTES # BLD AUTO: 0.03 THOUSAND/UL (ref 0–0.2)
IMM GRANULOCYTES NFR BLD AUTO: 0 % (ref 0–2)
KETONES UR STRIP-MCNC: NEGATIVE MG/DL
LEUKOCYTE ESTERASE UR QL STRIP: NEGATIVE
LIPASE SERPL-CCNC: 178 U/L (ref 73–393)
LYMPHOCYTES # BLD AUTO: 3.31 THOUSANDS/ΜL (ref 0.6–4.47)
LYMPHOCYTES NFR BLD AUTO: 30 % (ref 14–44)
MCH RBC QN AUTO: 20.8 PG (ref 26.8–34.3)
MCHC RBC AUTO-ENTMCNC: 27.6 G/DL (ref 31.4–37.4)
MCV RBC AUTO: 76 FL (ref 82–98)
MONOCYTES # BLD AUTO: 0.98 THOUSAND/ΜL (ref 0.17–1.22)
MONOCYTES NFR BLD AUTO: 9 % (ref 4–12)
NEUTROPHILS # BLD AUTO: 5.96 THOUSANDS/ΜL (ref 1.85–7.62)
NEUTS SEG NFR BLD AUTO: 55 % (ref 43–75)
NITRITE UR QL STRIP: NEGATIVE
NRBC BLD AUTO-RTO: 0 /100 WBCS
PH UR STRIP.AUTO: 6 [PH]
PLATELET # BLD AUTO: 289 THOUSANDS/UL (ref 149–390)
PMV BLD AUTO: 12.1 FL (ref 8.9–12.7)
POTASSIUM SERPL-SCNC: 4.2 MMOL/L (ref 3.5–5.3)
PROT SERPL-MCNC: 7.5 G/DL (ref 6.4–8.4)
PROT UR STRIP-MCNC: NEGATIVE MG/DL
RBC # BLD AUTO: 4.85 MILLION/UL (ref 3.81–5.12)
SODIUM SERPL-SCNC: 139 MMOL/L (ref 135–147)
SP GR UR STRIP.AUTO: 1 (ref 1–1.03)
UROBILINOGEN UR STRIP-ACNC: <2 MG/DL
WBC # BLD AUTO: 10.94 THOUSAND/UL (ref 4.31–10.16)

## 2022-07-30 PROCEDURE — 96365 THER/PROPH/DIAG IV INF INIT: CPT

## 2022-07-30 PROCEDURE — 80053 COMPREHEN METABOLIC PANEL: CPT | Performed by: INTERNAL MEDICINE

## 2022-07-30 PROCEDURE — 85025 COMPLETE CBC W/AUTO DIFF WBC: CPT | Performed by: INTERNAL MEDICINE

## 2022-07-30 PROCEDURE — 99284 EMERGENCY DEPT VISIT MOD MDM: CPT | Performed by: EMERGENCY MEDICINE

## 2022-07-30 PROCEDURE — 99283 EMERGENCY DEPT VISIT LOW MDM: CPT

## 2022-07-30 PROCEDURE — 36415 COLL VENOUS BLD VENIPUNCTURE: CPT | Performed by: INTERNAL MEDICINE

## 2022-07-30 PROCEDURE — 83690 ASSAY OF LIPASE: CPT | Performed by: INTERNAL MEDICINE

## 2022-07-30 PROCEDURE — 96366 THER/PROPH/DIAG IV INF ADDON: CPT

## 2022-07-30 PROCEDURE — 81003 URINALYSIS AUTO W/O SCOPE: CPT | Performed by: INTERNAL MEDICINE

## 2022-07-30 PROCEDURE — 81025 URINE PREGNANCY TEST: CPT | Performed by: INTERNAL MEDICINE

## 2022-07-30 RX ORDER — FAMOTIDINE 20 MG/1
20 TABLET, FILM COATED ORAL 2 TIMES DAILY
Qty: 60 TABLET | Refills: 0 | Status: SHIPPED | OUTPATIENT
Start: 2022-07-30 | End: 2022-08-29

## 2022-07-30 RX ORDER — MAGNESIUM HYDROXIDE/ALUMINUM HYDROXICE/SIMETHICONE 120; 1200; 1200 MG/30ML; MG/30ML; MG/30ML
30 SUSPENSION ORAL ONCE
Status: COMPLETED | OUTPATIENT
Start: 2022-07-30 | End: 2022-07-30

## 2022-07-30 RX ORDER — LIDOCAINE HYDROCHLORIDE 20 MG/ML
15 SOLUTION OROPHARYNGEAL ONCE
Status: COMPLETED | OUTPATIENT
Start: 2022-07-30 | End: 2022-07-30

## 2022-07-30 RX ADMIN — LIDOCAINE HYDROCHLORIDE 15 ML: 20 SOLUTION OROPHARYNGEAL at 21:25

## 2022-07-30 RX ADMIN — SODIUM CHLORIDE, SODIUM LACTATE, POTASSIUM CHLORIDE, AND CALCIUM CHLORIDE 1000 ML: .6; .31; .03; .02 INJECTION, SOLUTION INTRAVENOUS at 21:51

## 2022-07-30 RX ADMIN — ALUMINA, MAGNESIA, AND SIMETHICONE ORAL SUSPENSION REGULAR STRENGTH 30 ML: 1200; 1200; 120 SUSPENSION ORAL at 21:25

## 2022-07-31 NOTE — ED ATTENDING ATTESTATION
7/30/2022  ITamera MD, saw and evaluated the patient  I have discussed the patient with the resident/non-physician practitioner and agree with the resident's/non-physician practitioner's findings, Plan of Care, and MDM as documented in the resident's/non-physician practitioner's note, except where noted  All available labs and Radiology studies were reviewed  I was present for key portions of any procedure(s) performed by the resident/non-physician practitioner and I was immediately available to provide assistance  At this point I agree with the current assessment done in the Emergency Department  I have conducted an independent evaluation of this patient a history and physical is as follows:    ED Course         Critical Care Time  Procedures    36 yo female with epigastric pain worse with food since this morning  Pt also with headache and fatigue  Pt feels her bp is elevated  No sick contacts, no n/v, no numbness, tingling, weakness  pmh asthma  Vss, afebrile, lungs cta, rrr, abodmen soft tender epigastric area, no rebound, no guarding  Labs, urine preg, urine, gi cociktail, fluid

## 2022-07-31 NOTE — ED PROVIDER NOTES
History  Chief Complaint   Patient presents with    Hypertension     Pt presents ambulatory with c/o headache and epigastric pain  States she thinks her blood pressure is high     HPI  57-year-old female with past medical history of asthma presents to ED for evaluation of headache, fatigue, generalized weakness, dysuria and epigastric pain  Patient reports symptoms started this morning  She states she took Advil with some relief  She describes a generalized headache that is dull and mild  She reports she feels fatigued and overall weaker than normal   She reports epigastric pain that radiates across her abdomen  She states the pain is dull but intermittently sharp  It is not associated with any nausea, vomiting, diarrhea, melena or hematochezia  She states she has had pain like this before but does not know when  She states food makes the pain worse  She also reports uncomfortable urination  She denies any foul-smelling urine or vaginal discharge  She reports a previous  but otherwise no other abdominal surgeries  Patient also reports concern over her high blood pressure  She states she does not take any blood pressure medicines  Patient denies  visual changes, fevers, chills, chest pain, palpitations, new skin rashes or numbness or tingling of the extremities  Prior to Admission Medications   Prescriptions Last Dose Informant Patient Reported? Taking? albuterol (PROVENTIL HFA,VENTOLIN HFA) 90 mcg/act inhaler   Yes No   Sig: Inhale 1 puff every 6 (six) hours as needed for wheezing   montelukast (SINGULAIR) 10 mg tablet   Yes No   Sig: Take 10 mg by mouth daily at bedtime   omeprazole (PriLOSEC) 20 mg delayed release capsule   No No   Sig: Take 1 capsule (20 mg total) by mouth daily      Facility-Administered Medications: None       Past Medical History:   Diagnosis Date    Asthma        Past Surgical History:   Procedure Laterality Date     SECTION         History reviewed   No pertinent family history  I have reviewed and agree with the history as documented  E-Cigarette/Vaping     E-Cigarette/Vaping Substances     Social History     Tobacco Use    Smoking status: Never Smoker    Smokeless tobacco: Never Used   Substance Use Topics    Alcohol use: Yes     Comment: rarely    Drug use: No        Review of Systems   All other systems reviewed and are negative  Physical Exam  ED Triage Vitals   Temperature Pulse Respirations Blood Pressure SpO2   07/30/22 2019 07/30/22 2019 07/30/22 2019 07/30/22 2019 07/30/22 2019   97 7 °F (36 5 °C) 66 18 (!) 198/109 100 %      Temp Source Heart Rate Source Patient Position - Orthostatic VS BP Location FiO2 (%)   07/30/22 2019 07/30/22 2019 07/30/22 2145 07/30/22 2145 --   Tympanic Monitor Sitting Right arm       Pain Score       --                    Orthostatic Vital Signs  Vitals:    07/30/22 2019 07/30/22 2145   BP: (!) 198/109 140/95   Pulse: 66 65   Patient Position - Orthostatic VS:  Sitting       Physical Exam   Constitutional:  Well developed, well nourished, no acute distress, non-toxic appearance    HEENT:  Conjunctiva normal  Oropharynx moist  Respiratory:  No respiratory distress, normal breath sounds  Cardiovascular:  Normal rate, normal rhythm, no murmurs  GI:  Soft, obese, nondistended, normal bowel sounds, tenderness to palpation over epigastric region  :  No costovertebral angle tenderness   Musculoskeletal:  No edema, no tenderness, no deformities     Integument:  Well hydrated, no rash   Lymphatic:  No lymphadenopathy noted   Neurologic:  Alert & oriented, normal motor function, normal sensory function, no focal deficits noted   Psychiatric:  Speech and behavior appropriate       ED Medications  Medications   lactated ringers bolus 1,000 mL (1,000 mL Intravenous New Bag 7/30/22 2151)   aluminum-magnesium hydroxide-simethicone (MYLANTA) oral suspension 30 mL (30 mL Oral Given 7/30/22 2125)   Lidocaine Viscous HCl (XYLOCAINE) 2 % mucosal solution 15 mL (15 mL Swish & Swallow Given 7/30/22 2125)       Diagnostic Studies  Results Reviewed     Procedure Component Value Units Date/Time    Comprehensive metabolic panel [[id number]]  (Abnormal) Collected: 07/30/22 2128    Lab Status: Final result Specimen: Blood from Arm, Right Updated: 07/30/22 2153     Sodium 139 mmol/L      Potassium 4 2 mmol/L      Chloride 111 mmol/L      CO2 26 mmol/L      ANION GAP 2 mmol/L      BUN 15 mg/dL      Creatinine 0 90 mg/dL      Glucose 94 mg/dL      Calcium 9 4 mg/dL      AST 19 U/L      ALT 13 U/L      Alkaline Phosphatase 79 U/L      Total Protein 7 5 g/dL      Albumin 3 8 g/dL      Total Bilirubin 0 23 mg/dL      eGFR 78 ml/min/1 73sq m     Narrative:      [place] guidelines for Chronic Kidney Disease (CKD):     Stage 1 with normal or high GFR (GFR > 90 mL/min/1 73 square meters)    Stage 2 Mild CKD (GFR = 60-89 mL/min/1 73 square meters)    Stage 3A Moderate CKD (GFR = 45-59 mL/min/1 73 square meters)    Stage 3B Moderate CKD (GFR = 30-44 mL/min/1 73 square meters)    Stage 4 Severe CKD (GFR = 15-29 mL/min/1 73 square meters)    Stage 5 End Stage CKD (GFR <15 mL/min/1 73 square meters)  Note: GFR calculation is accurate only with a steady state creatinine    Lipase [[id number]]  (Normal) Collected: 07/30/22 2128    Lab Status: Final result Specimen: Blood from Arm, Right Updated: 07/30/22 2153     Lipase 178 u/L     CBC and differential [[id number]]  (Abnormal) Collected: 07/30/22 2128    Lab Status: Final result Specimen: Blood from Arm, Right Updated: 07/30/22 2142     WBC 10 94 Thousand/uL      RBC 4 85 Million/uL      Hemoglobin 10 1 g/dL      Hematocrit 36 6 %      MCV 76 fL      MCH 20 8 pg      MCHC 27 6 g/dL      RDW 15 8 %      MPV 12 1 fL      Platelets 351 Thousands/uL      nRBC 0 /100 WBCs      Neutrophils Relative 55 %      Immat GRANS % 0 %      Lymphocytes Relative 30 %      Monocytes Relative 9 % Eosinophils Relative 5 %      Basophils Relative 1 %      Neutrophils Absolute 5 96 Thousands/µL      Immature Grans Absolute 0 03 Thousand/uL      Lymphocytes Absolute 3 31 Thousands/µL      Monocytes Absolute 0 98 Thousand/µL      Eosinophils Absolute 0 55 Thousand/µL      Basophils Absolute 0 11 Thousands/µL     UA w Reflex to Microscopic w Reflex to Culture [[id number]] Collected: 07/30/22 2128    Lab Status: Final result Specimen: Urine, Clean Catch Updated: 07/30/22 2136     Color, UA Colorless     Clarity, UA Clear     Specific Gravity, UA 1 004     pH, UA 6 0     Leukocytes, UA Negative     Nitrite, UA Negative     Protein, UA Negative mg/dl      Glucose, UA Negative mg/dl      Ketones, UA Negative mg/dl      Urobilinogen, UA <2 0 mg/dl      Bilirubin, UA Negative     Occult Blood, UA Negative    POCT pregnancy, urine [[id number]]  (Normal) Resulted: 07/30/22 2130    Lab Status: Final result Specimen: Urine Updated: 07/30/22 2130     EXT PREG TEST UR (Ref: Negative) negative     Control valid                 No orders to display         Procedures  Procedures      ED Course                             SBIRT 22yo+    Flowsheet Row Most Recent Value   SBIRT (23 yo +)    In order to provide better care to our patients, we are screening all of our patients for alcohol and drug use  Would it be okay to ask you these screening questions? No Filed at: 07/30/2022 2131                MDM  Number of Diagnoses or Management Options  Abdominal pain  GERD (gastroesophageal reflux disease)  Diagnosis management comments: Patient treated with GI cocktail and IV fluids  On reexamination, she reports resolution of symptoms  Workup unremarkable  Will start trial of Pepcid and have patient follow-up with her PCP in 2 weeks for reassessment  Return precautions discussed and patient voiced understanding         Amount and/or Complexity of Data Reviewed  Clinical lab tests: ordered and reviewed  Discussion of test results with the performing providers: yes  Review and summarize past medical records: yes  Discuss the patient with other providers: yes    Risk of Complications, Morbidity, and/or Mortality  Presenting problems: moderate  Diagnostic procedures: moderate  Management options: low    Patient Progress  Patient progress: improved      Disposition  Final diagnoses:   GERD (gastroesophageal reflux disease)   Abdominal pain     Time reflects when diagnosis was documented in both MDM as applicable and the Disposition within this note     Time User Action Codes Description Comment    7/30/2022 10:21 PM Sudeep Moraes [K21 [id number]] GERD (gastroesophageal reflux disease)     7/30/2022 10:21 PM Sudeep Moraes [R10 [id number]] Abdominal pain       ED Disposition     ED Disposition   Discharge    Condition   Stable    Date/Time   Sat Jul 30, 2022 10:21 PM    Comment   Cy Carrier discharge to home/self care  Follow-up Information    None         Patient's Medications   Discharge Prescriptions    FAMOTIDINE (PEPCID) 20 MG TABLET    Take 1 tablet (20 mg total) by mouth 2 (two) times a day       Start Date: 7/30/2022 End Date: 8/29/2022       Order Dose: 20 mg       Quantity: 60 tablet    Refills: 0     No discharge procedures on file  PDMP Review     None           ED Provider  Attending physically available and evaluated Cy Carrier  I managed the patient along with the ED Attending      Electronically Signed by         Angelica Miller MD  07/30/22 9604

## 2022-09-08 ENCOUNTER — HOSPITAL ENCOUNTER (EMERGENCY)
Facility: HOSPITAL | Age: 42
Discharge: HOME | End: 2022-09-08
Attending: EMERGENCY MEDICINE
Payer: COMMERCIAL

## 2022-09-08 VITALS
SYSTOLIC BLOOD PRESSURE: 170 MMHG | OXYGEN SATURATION: 99 % | HEART RATE: 65 BPM | RESPIRATION RATE: 18 BRPM | DIASTOLIC BLOOD PRESSURE: 104 MMHG | TEMPERATURE: 98.1 F

## 2022-09-08 DIAGNOSIS — H60.392 OTHER INFECTIVE ACUTE OTITIS EXTERNA OF LEFT EAR: Primary | ICD-10-CM

## 2022-09-08 PROCEDURE — 99282 EMERGENCY DEPT VISIT SF MDM: CPT

## 2022-09-08 PROCEDURE — 99284 EMERGENCY DEPT VISIT MOD MDM: CPT | Performed by: EMERGENCY MEDICINE

## 2022-09-08 RX ORDER — CIPROFLOXACIN AND DEXAMETHASONE 3; 1 MG/ML; MG/ML
4 SUSPENSION/ DROPS AURICULAR (OTIC) 2 TIMES DAILY
Qty: 7.5 ML | Refills: 0 | Status: SHIPPED | OUTPATIENT
Start: 2022-09-08

## 2022-09-08 RX ORDER — NAPROXEN 500 MG/1
500 TABLET ORAL 2 TIMES DAILY WITH MEALS
Qty: 30 TABLET | Refills: 0 | Status: SHIPPED | OUTPATIENT
Start: 2022-09-08

## 2022-09-08 NOTE — ED PROVIDER NOTES
History  Chief Complaint   Patient presents with    Earache     Pt c/o left ear pain that radiates into jaw and neck since         History provided by:  Patient  Earache  Location:  Left  Quality:  Aching  Severity:  Mild  Onset quality:  Gradual  Timing:  Constant  Progression:  Worsening  Chronicity:  New  Relieved by:  Nothing  Worsened by:  Nothing  Ineffective treatments:  None tried  Associated symptoms: no abdominal pain, no cough, no diarrhea, no fever, no headaches, no rash, no rhinorrhea and no sore throat    Associated symptoms comment:  Left jaw pain and left neck pain radiation noted  Prior to Admission Medications   Prescriptions Last Dose Informant Patient Reported? Taking? albuterol (PROVENTIL HFA,VENTOLIN HFA) 90 mcg/act inhaler   Yes No   Sig: Inhale 1 puff every 6 (six) hours as needed for wheezing   famotidine (PEPCID) 20 mg tablet   No No   Sig: Take 1 tablet (20 mg total) by mouth 2 (two) times a day   montelukast (SINGULAIR) 10 mg tablet   Yes No   Sig: Take 10 mg by mouth daily at bedtime   omeprazole (PriLOSEC) 20 mg delayed release capsule   No No   Sig: Take 1 capsule (20 mg total) by mouth daily      Facility-Administered Medications: None       Past Medical History:   Diagnosis Date    Asthma     GERD (gastroesophageal reflux disease)        Past Surgical History:   Procedure Laterality Date     SECTION         History reviewed  No pertinent family history  I have reviewed and agree with the history as documented  E-Cigarette/Vaping     E-Cigarette/Vaping Substances     Social History     Tobacco Use    Smoking status: Never Smoker    Smokeless tobacco: Never Used   Substance Use Topics    Alcohol use: Yes     Comment: rarely    Drug use: No       Review of Systems   Constitutional: Negative for chills and fever  HENT: Positive for ear pain  Negative for rhinorrhea, sore throat and trouble swallowing  Eyes: Negative for pain     Respiratory: Negative for cough, shortness of breath, wheezing and stridor  Cardiovascular: Negative for chest pain and leg swelling  Gastrointestinal: Negative for abdominal pain, diarrhea and nausea  Endocrine: Negative for polyuria  Genitourinary: Negative for dysuria, flank pain and urgency  Musculoskeletal: Negative for joint swelling, myalgias and neck stiffness  Skin: Negative for rash  Allergic/Immunologic: Negative for immunocompromised state  Neurological: Negative for dizziness, syncope, weakness, numbness and headaches  Psychiatric/Behavioral: Negative for confusion and suicidal ideas  All other systems reviewed and are negative  Physical Exam  Physical Exam  Vitals and nursing note reviewed  Constitutional:       Appearance: Normal appearance  She is well-developed  HENT:      Head: Normocephalic and atraumatic  Right Ear: Tympanic membrane and ear canal normal       Left Ear: Tympanic membrane is not erythematous or bulging  Ears:      Comments: Left Ear canal with debris and noted inflammation,      Nose: Nose normal       Mouth/Throat:      Mouth: Mucous membranes are moist    Eyes:      Extraocular Movements: Extraocular movements intact  Pupils: Pupils are equal, round, and reactive to light  Cardiovascular:      Rate and Rhythm: Normal rate and regular rhythm  Heart sounds: No murmur heard  No friction rub  Pulmonary:      Effort: No respiratory distress  Breath sounds: Normal breath sounds  No wheezing or rales  Abdominal:      General: Bowel sounds are normal  There is no distension  Palpations: Abdomen is soft  Tenderness: There is no abdominal tenderness  Musculoskeletal:         General: No tenderness  Normal range of motion  Cervical back: Normal range of motion and neck supple  Skin:     General: Skin is warm  Findings: No rash  Neurological:      Mental Status: She is alert and oriented to person, place, and time  Psychiatric:         Mood and Affect: Mood normal          Vital Signs  ED Triage Vitals [09/08/22 0934]   Temperature Pulse Respirations Blood Pressure SpO2   98 1 °F (36 7 °C) 65 18 (!) 170/104 99 %      Temp Source Heart Rate Source Patient Position - Orthostatic VS BP Location FiO2 (%)   Oral Monitor Lying Right arm --      Pain Score       8           Vitals:    09/08/22 0934   BP: (!) 170/104   Pulse: 65   Patient Position - Orthostatic VS: Lying         Visual Acuity      ED Medications  Medications - No data to display    Diagnostic Studies  Results Reviewed     None                 No orders to display              Procedures  Procedures         ED Course                                             MDM  Number of Diagnoses or Management Options  Other infective acute otitis externa of left ear: new and requires workup  Diagnosis management comments: 53-year-old female presents with left ear discomfort and pain with noted symptoms radiating to left jaw as as left  Patient afebrile mild hypertension noted on initial triage vital signs  And examination there is noted tragus discomfort and pain with palpation as well as ear canal in the left with noted redness and inflammation some slight debris  Treatment with otic drops as well as pain medicine as an outpatient given strict instructions when to return back to the emergency department  Pt re-examined and evaluated after testing and treatment  Spoke with the patient and feeling improved and sxs have resolved  Will discharge home with close f/u with pcp and instructed to return to the ED if sxs worsen or continue  Pt agrees with the plan for discharge and feels comfortable to go home with proper f/u  Advised to return for worsening or additional problems  Diagnostic tests were reviewed and questions answered  Diagnosis, care plan and treatment options were discussed  The patient understand instructions and will follow up as directed      Counseling: I had a detailed discussion with the patient and/or guardian regarding: the historical points, exam findings, and any diagnostic results supporting the discharge diagnosis, lab results, radiology results, discharge instructions reviewed with patient and/or family/caregiver and understanding was verbalized  Instructions given to return to the emergency department if symptoms worsen or persist, or if there are any questions or concerns that arise at home  All labs reviewed and utilized in the medical decision making process    All radiology studies independently viewed by me and interpreted by the radiologist       Disposition  Final diagnoses:   Other infective acute otitis externa of left ear     Time reflects when diagnosis was documented in both MDM as applicable and the Disposition within this note     Time User Action Codes Description Comment    9/8/2022 10:00 AM Saikinamaggie Moraes [Z00 131] Other infective acute otitis externa of left ear       ED Disposition     ED Disposition   Discharge    Condition   Stable    Date/Time   Thu Sep 8, 2022  9:59 AM    Comment   Andrea Zimmerman discharge to home/self care  Follow-up Information    None         Patient's Medications   Discharge Prescriptions    CIPROFLOXACIN-DEXAMETHASONE (CIPRODEX) OTIC SUSPENSION    Administer 4 drops into the left ear 2 (two) times a day       Start Date: 9/8/2022  End Date: --       Order Dose: 4 drops       Quantity: 7 5 mL    Refills: 0    NAPROXEN (NAPROSYN) 500 MG TABLET    Take 1 tablet (500 mg total) by mouth 2 (two) times a day with meals       Start Date: 9/8/2022  End Date: --       Order Dose: 500 mg       Quantity: 30 tablet    Refills: 0       No discharge procedures on file      PDMP Review     None          ED Provider  Electronically Signed by           Onel Keys DO  09/08/22 9364

## 2022-10-31 ENCOUNTER — HOSPITAL ENCOUNTER (EMERGENCY)
Facility: HOSPITAL | Age: 42
Discharge: HOME | End: 2022-10-31
Attending: EMERGENCY MEDICINE

## 2022-10-31 VITALS
TEMPERATURE: 97.6 F | OXYGEN SATURATION: 98 % | SYSTOLIC BLOOD PRESSURE: 146 MMHG | RESPIRATION RATE: 20 BRPM | DIASTOLIC BLOOD PRESSURE: 73 MMHG | HEART RATE: 76 BPM

## 2022-10-31 DIAGNOSIS — R10.9 ABDOMINAL PAIN: Primary | ICD-10-CM

## 2022-10-31 LAB
ALBUMIN SERPL BCP-MCNC: 3.4 G/DL (ref 3.5–5)
ALP SERPL-CCNC: 63 U/L (ref 46–116)
ALT SERPL W P-5'-P-CCNC: 11 U/L (ref 12–78)
ANION GAP SERPL CALCULATED.3IONS-SCNC: 2 MMOL/L (ref 4–13)
ANISOCYTOSIS BLD QL SMEAR: PRESENT
AST SERPL W P-5'-P-CCNC: 12 U/L (ref 5–45)
BASOPHILS # BLD MANUAL: 0 THOUSAND/UL (ref 0–0.1)
BASOPHILS NFR MAR MANUAL: 0 % (ref 0–1)
BILIRUB SERPL-MCNC: 0.46 MG/DL (ref 0.2–1)
BILIRUB UR QL STRIP: NEGATIVE
BUN SERPL-MCNC: 8 MG/DL (ref 5–25)
CALCIUM ALBUM COR SERPL-MCNC: 9.8 MG/DL (ref 8.3–10.1)
CALCIUM SERPL-MCNC: 9.3 MG/DL (ref 8.3–10.1)
CHLORIDE SERPL-SCNC: 108 MMOL/L (ref 96–108)
CLARITY UR: CLEAR
CO2 SERPL-SCNC: 29 MMOL/L (ref 21–32)
COLOR UR: NORMAL
CREAT SERPL-MCNC: 0.74 MG/DL (ref 0.6–1.3)
EOSINOPHIL # BLD MANUAL: 0.35 THOUSAND/UL (ref 0–0.4)
EOSINOPHIL NFR BLD MANUAL: 4 % (ref 0–6)
ERYTHROCYTE [DISTWIDTH] IN BLOOD BY AUTOMATED COUNT: 18.2 % (ref 11.6–15.1)
EXT PREG TEST URINE: NEGATIVE
EXT. CONTROL ED NAV: NORMAL
GFR SERPL CREATININE-BSD FRML MDRD: 98 ML/MIN/1.73SQ M
GLUCOSE SERPL-MCNC: 97 MG/DL (ref 65–140)
GLUCOSE UR STRIP-MCNC: NEGATIVE MG/DL
HCT VFR BLD AUTO: 41.5 % (ref 34.8–46.1)
HGB BLD-MCNC: 12.4 G/DL (ref 11.5–15.4)
HGB UR QL STRIP.AUTO: NEGATIVE
KETONES UR STRIP-MCNC: NEGATIVE MG/DL
LEUKOCYTE ESTERASE UR QL STRIP: NEGATIVE
LIPASE SERPL-CCNC: 174 U/L (ref 73–393)
LYMPHOCYTES # BLD AUTO: 3.4 THOUSAND/UL (ref 0.6–4.47)
LYMPHOCYTES # BLD AUTO: 39 % (ref 14–44)
MCH RBC QN AUTO: 24.1 PG (ref 26.8–34.3)
MCHC RBC AUTO-ENTMCNC: 29.9 G/DL (ref 31.4–37.4)
MCV RBC AUTO: 81 FL (ref 82–98)
MONOCYTES # BLD AUTO: 0.17 THOUSAND/UL (ref 0–1.22)
MONOCYTES NFR BLD: 2 % (ref 4–12)
NEUTROPHILS # BLD MANUAL: 4.8 THOUSAND/UL (ref 1.85–7.62)
NEUTS BAND NFR BLD MANUAL: 2 % (ref 0–8)
NEUTS SEG NFR BLD AUTO: 53 % (ref 43–75)
NITRITE UR QL STRIP: NEGATIVE
PH UR STRIP.AUTO: 7 [PH]
PLATELET # BLD AUTO: 253 THOUSANDS/UL (ref 149–390)
PLATELET BLD QL SMEAR: ADEQUATE
PMV BLD AUTO: 12.1 FL (ref 8.9–12.7)
POIKILOCYTOSIS BLD QL SMEAR: PRESENT
POTASSIUM SERPL-SCNC: 4.2 MMOL/L (ref 3.5–5.3)
PROT SERPL-MCNC: 7.3 G/DL (ref 6.4–8.4)
PROT UR STRIP-MCNC: NEGATIVE MG/DL
RBC # BLD AUTO: 5.15 MILLION/UL (ref 3.81–5.12)
RBC MORPH BLD: PRESENT
SODIUM SERPL-SCNC: 139 MMOL/L (ref 135–147)
SP GR UR STRIP.AUTO: 1.01 (ref 1–1.03)
UROBILINOGEN UR STRIP-ACNC: <2 MG/DL
WBC # BLD AUTO: 8.73 THOUSAND/UL (ref 4.31–10.16)

## 2022-10-31 RX ORDER — KETOROLAC TROMETHAMINE 30 MG/ML
15 INJECTION, SOLUTION INTRAMUSCULAR; INTRAVENOUS ONCE
Status: COMPLETED | OUTPATIENT
Start: 2022-10-31 | End: 2022-10-31

## 2022-10-31 RX ADMIN — KETOROLAC TROMETHAMINE 15 MG: 30 INJECTION, SOLUTION INTRAMUSCULAR; INTRAVENOUS at 13:14

## 2022-10-31 NOTE — DISCHARGE INSTRUCTIONS
You have been seen for abdominal pain  You should return to the ED if you develop fevers, vomiting, diarrhea, urinary symptoms, or other worsening symptoms  Follow up with your primary care physician  Take Advil or Motrin for pain

## 2022-10-31 NOTE — ED PROVIDER NOTES
History  Chief Complaint   Patient presents with   • Abdominal Pain     Pt c o generalized abdominal pain for three days  Denies N/V/D     38year old female with a PMH of asthma and GERD presents with abdominal pain  It is all over her abdomen  She reports no nausea, vomiting, or diarrhea  No fevers  Patient describes the pain as a discomfort all over  It is always there and nothing makes it better or worse  She is still able to eat and drink normally  Food does not make the pain worse  She reports no fevers  No urinary symptoms  First day of last menstrual period was on   Patient tried ibuprofen once and it helped her symptoms, but she did not continue it     She has never had a pain like this before   [id number] used  Prior to Admission Medications   Prescriptions Last Dose Informant Patient Reported? Taking? albuterol (PROVENTIL HFA,VENTOLIN HFA) 90 mcg/act inhaler   Yes No   Sig: Inhale 1 puff every 6 (six) hours as needed for wheezing   ciprofloxacin-dexamethasone (CIPRODEX) otic suspension   No No   Sig: Administer 4 drops into the left ear 2 (two) times a day   famotidine (PEPCID) 20 mg tablet   No No   Sig: Take 1 tablet (20 mg total) by mouth 2 (two) times a day   montelukast (SINGULAIR) 10 mg tablet   Yes No   Sig: Take 10 mg by mouth daily at bedtime   naproxen (Naprosyn) 500 mg tablet   No No   Sig: Take 1 tablet (500 mg total) by mouth 2 (two) times a day with meals   omeprazole (PriLOSEC) 20 mg delayed release capsule 10/30/2022 at Unknown time  No Yes   Sig: Take 1 capsule (20 mg total) by mouth daily      Facility-Administered Medications: None       Past Medical History:   Diagnosis Date   • Asthma    • GERD (gastroesophageal reflux disease)        Past Surgical History:   Procedure Laterality Date   •  SECTION         History reviewed  No pertinent family history    I have reviewed and agree with the history as documented  E-Cigarette/Vaping     E-Cigarette/Vaping Substances     Social History     Tobacco Use   • Smoking status: Never Smoker   • Smokeless tobacco: Never Used   Substance Use Topics   • Alcohol use: Yes     Comment: rarely   • Drug use: No        Review of Systems   Constitutional: Negative for chills, fatigue and fever  HENT: Negative for congestion, rhinorrhea and sore throat  Eyes: Negative for pain and redness  Respiratory: Negative for cough, chest tightness, shortness of breath and wheezing  Cardiovascular: Negative for chest pain and palpitations  Gastrointestinal: Positive for abdominal pain  Negative for diarrhea, nausea and vomiting  Endocrine: Negative  Genitourinary: Negative for difficulty urinating and hematuria  Musculoskeletal: Negative for back pain and myalgias  Skin: Negative for pallor and rash  Allergic/Immunologic: Negative  Neurological: Negative for dizziness, weakness, light-headedness and headaches  Hematological: Negative  Physical Exam  ED Triage Vitals   Temperature Pulse Respirations Blood Pressure SpO2   10/31/22 1149 10/31/22 1150 10/31/22 1150 10/31/22 1150 10/31/22 1150   97 6 °F (36 4 °C) 76 20 146/73 98 %      Temp src Heart Rate Source Patient Position - Orthostatic VS BP Location FiO2 (%)   -- 10/31/22 1150 -- -- --    Monitor         Pain Score       10/31/22 1150       7             Orthostatic Vital Signs  Vitals:    10/31/22 1150   BP: 146/73   Pulse: 76       Physical Exam  Vitals and nursing note reviewed  Constitutional:       General: She is not in acute distress  Appearance: Normal appearance  She is not ill-appearing  HENT:      Head: Normocephalic and atraumatic  Eyes:      Conjunctiva/sclera: Conjunctivae normal    Cardiovascular:      Rate and Rhythm: Normal rate and regular rhythm  Pulmonary:      Effort: Pulmonary effort is normal  No respiratory distress  Abdominal:      General: Abdomen is flat   There is no distension  Palpations: Abdomen is soft  Tenderness: There is no abdominal tenderness  There is no guarding or rebound  Musculoskeletal:         General: Normal range of motion  Cervical back: Normal range of motion and neck supple  Skin:     General: Skin is warm and dry  Neurological:      General: No focal deficit present  Mental Status: She is alert and oriented to person, place, and time  ED Medications  Medications   ketorolac (TORADOL) injection 15 mg (15 mg Intravenous Given 10/31/22 1314)       Diagnostic Studies  Results Reviewed     Procedure Component Value Units Date/Time    CBC and differential [[id number]]  (Abnormal) Collected: 10/31/22 1317    Lab Status: Final result Specimen: Blood from Arm, Left Updated: 10/31/22 1530     WBC 8 73 Thousand/uL      RBC 5 15 Million/uL      Hemoglobin 12 4 g/dL      Hematocrit 41 5 %      MCV 81 fL      MCH 24 1 pg      MCHC 29 9 g/dL      RDW 18 2 %      MPV 12 1 fL      Platelets 640 Thousands/uL     Narrative: This is an appended report  These results have been appended to a previously verified report      Manual Differential(PHLEBS Do Not Order) [[id number]]  (Abnormal) Collected: 10/31/22 1317    Lab Status: Final result Specimen: Blood from Arm, Left Updated: 10/31/22 1530     Segmented % 53 %      Bands % 2 %      Lymphocytes % 39 %      Monocytes % 2 %      Eosinophils, % 4 %      Basophils % 0 %      Absolute Neutrophils 4 80 Thousand/uL      Lymphocytes Absolute 3 40 Thousand/uL      Monocytes Absolute 0 17 Thousand/uL      Eosinophils Absolute 0 35 Thousand/uL      Basophils Absolute 0 00 Thousand/uL      Total Counted --     RBC Morphology Present     Anisocytosis Present     Poikilocytes Present     Platelet Estimate Adequate    Lipase [[id number]]  (Normal) Collected: 10/31/22 1317    Lab Status: Final result Specimen: Blood from Arm, Left Updated: 10/31/22 1352     Lipase 174 u/L     Comprehensive metabolic panel [[id number]]  (Abnormal) Collected: 10/31/22 1317    Lab Status: Final result Specimen: Blood from Arm, Left Updated: 10/31/22 1352     Sodium 139 mmol/L      Potassium 4 2 mmol/L      Chloride 108 mmol/L      CO2 29 mmol/L      ANION GAP 2 mmol/L      BUN 8 mg/dL      Creatinine 0 74 mg/dL      Glucose 97 mg/dL      Calcium 9 3 mg/dL      Corrected Calcium 9 8 mg/dL      AST 12 U/L      ALT 11 U/L      Alkaline Phosphatase 63 U/L      Total Protein 7 3 g/dL      Albumin 3 4 g/dL      Total Bilirubin 0 46 mg/dL      eGFR 98 ml/min/1 73sq m     Narrative:      [place] guidelines for Chronic Kidney Disease (CKD):   •  Stage 1 with normal or high GFR (GFR > 90 mL/min/1 73 square meters)  •  Stage 2 Mild CKD (GFR = 60-89 mL/min/1 73 square meters)  •  Stage 3A Moderate CKD (GFR = 45-59 mL/min/1 73 square meters)  •  Stage 3B Moderate CKD (GFR = 30-44 mL/min/1 73 square meters)  •  Stage 4 Severe CKD (GFR = 15-29 mL/min/1 73 square meters)  •  Stage 5 End Stage CKD (GFR <15 mL/min/1 73 square meters)  Note: GFR calculation is accurate only with a steady state creatinine    UA w Reflex to Microscopic w Reflex to Culture [[id number]] Collected: 10/31/22 1317    Lab Status: Final result Specimen: Urine, Clean Catch Updated: 10/31/22 1339     Color, UA Light Yellow     Clarity, UA Clear     Specific Gravity, UA 1 008     pH, UA 7 0     Leukocytes, UA Negative     Nitrite, UA Negative     Protein, UA Negative mg/dl      Glucose, UA Negative mg/dl      Ketones, UA Negative mg/dl      Urobilinogen, UA <2 0 mg/dl      Bilirubin, UA Negative     Occult Blood, UA Negative    POCT pregnancy, urine [[id number]]  (Normal) Resulted: 10/31/22 1325    Lab Status: Final result Updated: 10/31/22 1326     EXT PREG TEST UR (Ref: Negative) negative     Control valid                 No orders to display         Procedures  Procedures      ED Course                             SBIRT 22yo+    Flowsheet Row Most Recent Value   SBIRT (24 yo +)    In order to provide better care to our patients, we are screening all of our patients for alcohol and drug use  Would it be okay to ask you these screening questions? Unable to answer at this time Filed at: 10/31/2022 1151                [facility]  Number of Diagnoses or Management Options  Abdominal pain: established and improving  Diagnosis management comments: 64-year-old female presents with abdominal pain  Patient has nontender on exam   Very low suspicion for intra-abdominal pathology that would require emergent intervention  Will check labs and urine and give Toradol and re-evaluate  Amount and/or Complexity of Data Reviewed  Clinical lab tests: reviewed and ordered  Review and summarize past medical records: yes  Discuss the patient with other providers: yes    Risk of Complications, Morbidity, and/or Mortality  Presenting problems: low  Diagnostic procedures: low  Management options: low    Patient Progress  Patient progress: improved    Patient felt better after medications  Recommend continuing NSAIDs  Recommend follow up with primary care physician  Return precautions given  All questions answered  Disposition  Final diagnoses:   Abdominal pain     Time reflects when diagnosis was documented in both MDM as applicable and the Disposition within this note     Time User Action Codes Description Comment    10/31/2022  2:14 PM Shey Liang Add [R10 9] Abdominal pain       ED Disposition     ED Disposition   Discharge    Condition   Good    Date/Time   Mon Oct 31, 2022  2:14 PM    Comment   Keerthi Solorio discharge to home/self care                 Follow-up Information     Follow up With Specialties Details Why Contact Info Additional [address]   [address], [facility] [address]  [address] [place] [address], [address], [address], [facility] [place], [address] [place]          Discharge Medication List as of 10/31/2022  2:15 PM      CONTINUE these medications which have NOT CHANGED    Details   omeprazole (PriLOSEC) 20 mg delayed release capsule Take 1 capsule (20 mg total) by mouth daily, Starting Tue 9/14/2021, Print      albuterol (PROVENTIL HFA,VENTOLIN HFA) 90 mcg/act inhaler Inhale 1 puff every 6 (six) hours as needed for wheezing, Historical Med      ciprofloxacin-dexamethasone (CIPRODEX) otic suspension Administer 4 drops into the left ear 2 (two) times a day, Starting Thu 9/8/2022, Normal      famotidine (PEPCID) 20 mg tablet Take 1 tablet (20 mg total) by mouth 2 (two) times a day, Starting Sat 7/30/2022, Until Mon 8/29/2022, Normal      montelukast (SINGULAIR) 10 mg tablet Take 10 mg by mouth daily at bedtime, Historical Med      naproxen (Naprosyn) 500 mg tablet Take 1 tablet (500 mg total) by mouth 2 (two) times a day with meals, Starting Thu 9/8/2022, Normal           No discharge procedures on file  PDMP Review     None           ED Provider  Attending physically available and evaluated Mayte Malik  I managed the patient along with the ED Attending      Electronically Signed by         Angle Guillen DO  11/01/22 2009

## 2022-10-31 NOTE — ED ATTENDING ATTESTATION
Final Diagnosis:  1  Abdominal pain           Mora BRUNER MD, saw and evaluated the patient  All available labs and X-rays were ordered by me or the resident and have been reviewed by myself  I discussed the patient with the resident / non-physician and agree with the resident's / non-physician practitioner's findings and plan as documented in the resident's / non-physician practicitioner's note, except where noted  At this point, I agree with the current assessment done in the ED  I was present during key portions of all procedures performed unless otherwise stated  Chief Complaint   Patient presents with    Abdominal Pain     Pt c o generalized abdominal pain for three days  Denies N/V/D     This is a 38 y o  female presenting for evaluation of diffuse belly pain x3 days  Nothing helps or worsenins  No f/ch/nv//cps/ob  Eating/drinking okay  n osick contacts  Tried motrin on Friday which helped pain but didn't use again  PMH:   has a past medical history of Asthma and GERD (gastroesophageal reflux disease)  PSH:   has a past surgical history that includes  section  Social:  Social History     Substance and Sexual Activity   Alcohol Use Yes    Comment: rarely     Social History     Tobacco Use   Smoking Status Never Smoker   Smokeless Tobacco Never Used     Social History     Substance and Sexual Activity   Drug Use No     PE:  Vitals:    10/31/22 1149 10/31/22 1150   BP:  146/73   Pulse:  76   Resp:  20   Temp: 97 6 °F (36 4 °C)    SpO2:  98%   General: VSS, NAD, awake, alert  Well-nourished, well-developed  Appears stated age  Head: Normocephalic, atraumatic, nontender  Eyes: PERRL, EOM-I  No diplopia  No hyphema  No subconjunctival hemorrhages  Symmetrical lids  ENTAtraumatic external nose and ears  MMM  No stridor  Normal phonation  No drooling  Base of mouth is soft  No mastoid tenderness  Neck: Symmetric, trachea midline  No JVD    CV: Peripheral pulses +2 throughout  No chest wall tenderness  Lungs:   Unlabored   No retractions  No crepitus  No tachypnea  No paradoxical motion  Abd: +BS, soft, diffuse mild tenderness  ND  No guarding  No rigidity  No rebound  MSK:   FROM   Back:   No CVAT  Skin: Dry, intact  Neuro: AAOx3, GCS 15, CN II-XII grossly intact  Motor grossly intact  Psychiatric/Behavioral: Appropriate mood and affect   Exam: deferred  A:  - Belly pain  P:  - reassuring exam  - labs  - fluids  - sympomtaic measures    - 13 point ROS was performed and all are normal unless stated in the history above  - Nursing note reviewed  Vitals reviewed  - Orders placed by myself and/or advanced practitioner / resident     - Previous chart was reviewed  - No language barrier    - History obtained from patient  - There are no limitations to the history obtained  - Critical care time: Not applicable for this patient  Code Status: No Order  Advance Directive and Living Will:      Power of :    POLST:      Medications   ketorolac (TORADOL) injection 15 mg (15 mg Intravenous Given 10/31/22 1314)     No orders to display     Orders Placed This Encounter   Procedures    CBC and differential    Comprehensive metabolic panel    Lipase    UA w Reflex to Microscopic w Reflex to Culture    POCT pregnancy, urine     Labs Reviewed   COMPREHENSIVE METABOLIC PANEL - Abnormal       Result Value Ref Range Status    Sodium 139  135 - 147 mmol/L Final    Potassium 4 2  3 5 - 5 3 mmol/L Final    Chloride 108  96 - 108 mmol/L Final    CO2 29  21 - 32 mmol/L Final    ANION GAP 2 (*) 4 - 13 mmol/L Final    BUN 8  5 - 25 mg/dL Final    Creatinine 0 74  0 60 - 1 30 mg/dL Final    Comment: Standardized to IDMS reference method    Glucose 97  65 - 140 mg/dL Final    Comment: If the patient is fasting, the ADA then defines impaired fasting glucose as > 100 mg/dL and diabetes as > or equal to 123 mg/dL    Specimen collection should occur prior to Sulfasalazine administration due to the potential for falsely depressed results  Specimen collection should occur prior to Sulfapyridine administration due to the potential for falsely elevated results  Calcium 9 3  8 3 - 10 1 mg/dL Final    Corrected Calcium 9 8  8 3 - 10 1 mg/dL Final    AST 12  5 - 45 U/L Final    Comment: Specimen collection should occur prior to Sulfasalazine administration due to the potential for falsely depressed results  ALT 11 (*) 12 - 78 U/L Final    Comment: Specimen collection should occur prior to Sulfasalazine and/or Sulfapyridine administration due to the potential for falsely depressed results  Alkaline Phosphatase 63  46 - 116 U/L Final    Total Protein 7 3  6 4 - 8 4 g/dL Final    Albumin 3 4 (*) 3 5 - 5 0 g/dL Final    Total Bilirubin 0 46  0 20 - 1 00 mg/dL Final    Comment: Use of this assay is not recommended for patients undergoing treatment with eltrombopag due to the potential for falsely elevated results      eGFR 98  ml/min/1 73sq m Final    Narrative:     National Kidney Disease Foundation guidelines for Chronic Kidney Disease (CKD):     Stage 1 with normal or high GFR (GFR > 90 mL/min/1 73 square meters)    Stage 2 Mild CKD (GFR = 60-89 mL/min/1 73 square meters)    Stage 3A Moderate CKD (GFR = 45-59 mL/min/1 73 square meters)    Stage 3B Moderate CKD (GFR = 30-44 mL/min/1 73 square meters)    Stage 4 Severe CKD (GFR = 15-29 mL/min/1 73 square meters)    Stage 5 End Stage CKD (GFR <15 mL/min/1 73 square meters)  Note: GFR calculation is accurate only with a steady state creatinine   LIPASE - Normal    Lipase 174  73 - 393 u/L Final   POCT PREGNANCY, URINE - Normal    EXT PREG TEST UR (Ref: Negative) negative   Final    Control valid   Final   UA W REFLEX TO MICROSCOPIC WITH REFLEX TO CULTURE    Color, UA Light Yellow   Final    Clarity, UA Clear   Final    Specific Gravity, UA 1 008  1 003 - 1 030 Final    pH, UA 7 0  4 5, 5 0, 5 5, 6 0, 6 5, 7 0, 7 5, 8 0 Final    Leukocytes, UA Negative  Negative Final    Nitrite, UA Negative  Negative Final    Protein, UA Negative  Negative mg/dl Final    Glucose, UA Negative  Negative mg/dl Final    Ketones, UA Negative  Negative mg/dl Final    Urobilinogen, UA <2 0  <2 0 mg/dl mg/dl Final    Bilirubin, UA Negative  Negative Final    Occult Blood, UA Negative  Negative Final     Time reflects when diagnosis was documented in both MDM as applicable and the Disposition within this note       Time User Action Codes Description Comment    10/31/2022  2:14 PM Christine Graff Add [R10 9] Abdominal pain           ED Disposition       ED Disposition   Discharge    Condition   Good    Date/Time   Mon Oct 31, 2022  2:14 PM    Comment   Syl Barrientoss discharge to home/self care                     Follow-up Information       Follow up With Specialties Details Why Contact Info Additional [address]   [address], [address] [facility] [phone]  [address], [address], [address] [place], [place], [address] [facility] [address]          Discharge Medication List as of 10/31/2022  2:15 PM        CONTINUE these medications which have NOT CHANGED    Details   omeprazole (PriLOSEC) 20 mg delayed release capsule Take 1 capsule (20 mg total) by mouth daily, Starting Tue 9/14/2021, Print      albuterol (PROVENTIL HFA,VENTOLIN HFA) 90 mcg/act inhaler Inhale 1 puff every 6 (six) hours as needed for wheezing, Historical Med      ciprofloxacin-dexamethasone (CIPRODEX) otic suspension Administer 4 drops into the left ear 2 (two) times a day, Starting Thu 9/8/2022, Normal      famotidine (PEPCID) 20 mg tablet Take 1 tablet (20 mg total) by mouth 2 (two) times a day, Starting Sat 7/30/2022, Until Mon 8/29/2022, Normal      montelukast (SINGULAIR) 10 mg tablet Take 10 mg by mouth daily at bedtime, Historical Med      naproxen (Naprosyn) 500 mg tablet Take 1 tablet (500 mg total) by mouth 2 (two) times a day with meals, Starting u 9/8/2022, Normal           No discharge procedures on file  Prior to Admission Medications   Prescriptions Last Dose Informant Patient Reported? Taking? albuterol (PROVENTIL HFA,VENTOLIN HFA) 90 mcg/act inhaler   Yes No   Sig: Inhale 1 puff every 6 (six) hours as needed for wheezing   ciprofloxacin-dexamethasone (CIPRODEX) otic suspension   No No   Sig: Administer 4 drops into the left ear 2 (two) times a day   famotidine (PEPCID) 20 mg tablet   No No   Sig: Take 1 tablet (20 mg total) by mouth 2 (two) times a day   montelukast (SINGULAIR) 10 mg tablet   Yes No   Sig: Take 10 mg by mouth daily at bedtime   naproxen (Naprosyn) 500 mg tablet   No No   Sig: Take 1 tablet (500 mg total) by mouth 2 (two) times a day with meals   omeprazole (PriLOSEC) 20 mg delayed release capsule 10/30/2022 at Unknown time  No Yes   Sig: Take 1 capsule (20 mg total) by mouth daily      Facility-Administered Medications: None       Portions of the record may have been created with voice recognition software  Occasional wrong word or "sound a like" substitutions may have occurred due to the inherent limitations of voice recognition software  Read the chart carefully and recognize, using context, where substitutions have occurred      Electronically signed by:  Clarence Callahan

## 2022-12-30 ENCOUNTER — HOSPITAL ENCOUNTER (EMERGENCY)
Facility: HOSPITAL | Age: 42
Discharge: HOME | End: 2022-12-30
Attending: EMERGENCY MEDICINE

## 2022-12-30 VITALS
TEMPERATURE: 98.7 F | SYSTOLIC BLOOD PRESSURE: 177 MMHG | HEART RATE: 75 BPM | DIASTOLIC BLOOD PRESSURE: 108 MMHG | RESPIRATION RATE: 18 BRPM | OXYGEN SATURATION: 99 %

## 2022-12-30 DIAGNOSIS — B34.9 VIRAL ILLNESS: Primary | ICD-10-CM

## 2022-12-30 NOTE — Clinical Note
Stephanie Yi was seen and treated in our emergency department on 12/30/2022  No restrictions    ? ? Diagnosis: viral illness    Adelaida  ? Migue Rivera She may return on this date: 01/04/2023    Please wear mask around other people until 1/7     If you have any questions or concerns, please don't hesitate to call        Sherita Meigs, PA-C    ______________________________           _______________          _______________  Hospital Representative                              Date                                Time

## 2022-12-30 NOTE — ED PROVIDER NOTES
History  Chief Complaint   Patient presents with   • Cold Like Symptoms     Pt c/o fever, cough     35yo female who presents to ER for evaluation of cough, sore throat, nasal congestion, and left ear pain  Onset 3 days ago  States she took a covid test at home which was positive  Denies chest pain, sob, abdominal pain, n/v/d  States she needs a work note  History provided by:  Patient  URI  Presenting symptoms: congestion, cough, ear pain, fever and sore throat    Severity:  Moderate  Onset quality:  Gradual  Duration:  3 days  Timing:  Constant  Progression:  Unchanged  Chronicity:  New  Associated symptoms: myalgias        Prior to Admission Medications   Prescriptions Last Dose Informant Patient Reported? Taking? albuterol (PROVENTIL HFA,VENTOLIN HFA) 90 mcg/act inhaler   Yes No   Sig: Inhale 1 puff every 6 (six) hours as needed for wheezing   ciprofloxacin-dexamethasone (CIPRODEX) otic suspension   No No   Sig: Administer 4 drops into the left ear 2 (two) times a day   famotidine (PEPCID) 20 mg tablet   No No   Sig: Take 1 tablet (20 mg total) by mouth 2 (two) times a day   montelukast (SINGULAIR) 10 mg tablet   Yes No   Sig: Take 10 mg by mouth daily at bedtime   naproxen (Naprosyn) 500 mg tablet   No No   Sig: Take 1 tablet (500 mg total) by mouth 2 (two) times a day with meals   omeprazole (PriLOSEC) 20 mg delayed release capsule   No No   Sig: Take 1 capsule (20 mg total) by mouth daily      Facility-Administered Medications: None       Past Medical History:   Diagnosis Date   • Asthma    • GERD (gastroesophageal reflux disease)        Past Surgical History:   Procedure Laterality Date   •  SECTION         History reviewed  No pertinent family history  I have reviewed and agree with the history as documented      E-Cigarette/Vaping     E-Cigarette/Vaping Substances     Social History     Tobacco Use   • Smoking status: Never   • Smokeless tobacco: Never   Substance Use Topics   • Alcohol use: Yes     Comment: rarely   • Drug use: No       Review of Systems   Constitutional: Positive for chills and fever  HENT: Positive for congestion, ear pain and sore throat  Eyes: Negative for redness  Respiratory: Positive for cough  Negative for shortness of breath  Cardiovascular: Negative for chest pain  Gastrointestinal: Negative for abdominal pain, diarrhea, nausea and vomiting  Musculoskeletal: Positive for myalgias  Skin: Negative for rash  Physical Exam  Physical Exam  Vitals and nursing note reviewed  Constitutional:       Appearance: Normal appearance  She is well-developed  HENT:      Head: Atraumatic  Right Ear: Tympanic membrane and external ear normal       Left Ear: Tympanic membrane and external ear normal       Nose: Nose normal  No rhinorrhea  Mouth/Throat:      Mouth: Mucous membranes are moist       Pharynx: Uvula midline  No oropharyngeal exudate or posterior oropharyngeal erythema  Tonsils: No tonsillar exudate  Eyes:      General: No scleral icterus  Conjunctiva/sclera: Conjunctivae normal    Cardiovascular:      Rate and Rhythm: Normal rate and regular rhythm  Heart sounds: Normal heart sounds  Pulmonary:      Effort: Pulmonary effort is normal       Breath sounds: Normal breath sounds  Musculoskeletal:      Cervical back: Normal range of motion and neck supple  Tenderness (mild) present  No rigidity  Lymphadenopathy:      Cervical: No cervical adenopathy  Skin:     General: Skin is warm and dry  Findings: No rash  Neurological:      Mental Status: She is alert and oriented to person, place, and time     Psychiatric:         Mood and Affect: Mood normal          Vital Signs  ED Triage Vitals [12/30/22 0848]   Temperature Pulse Respirations Blood Pressure SpO2   98 7 °F (37 1 °C) 75 18 (!) 177/108 99 %      Temp Source Heart Rate Source Patient Position - Orthostatic VS BP Location FiO2 (%)   Oral Monitor Sitting Right arm -- Pain Score       7           Vitals:    12/30/22 0848   BP: (!) 177/108   Pulse: 75   Patient Position - Orthostatic VS: Sitting         Visual Acuity      ED Medications  Medications - No data to display    Diagnostic Studies  Results Reviewed     Procedure Component Value Units Date/Time    FLU/COVID - if FLU clinically relevant [[id number]] Collected: 12/30/22 0920    Lab Status: In process Specimen: Nares from Nose Updated: 12/30/22 0924                 No orders to display              Procedures  Procedures         ED Course                                             MDM  Number of Diagnoses or Management Options     Amount and/or Complexity of Data Reviewed  Clinical lab tests: ordered    Risk of Complications, Morbidity, and/or Mortality  Presenting problems: low        Disposition  Final diagnoses:   Viral illness     Time reflects when diagnosis was documented in both MDM as applicable and the Disposition within this note     Time User Action Codes Description Comment    12/30/2022  9:19 AM Matt Diaz [B34 9] Viral illness       ED Disposition     ED Disposition   Discharge    Condition   Stable    Date/Time   Fri Dec 30, 2022  9:19 AM    Comment   Tarah Marcum discharge to home/self care                 Follow-up Information     Follow up With Specialties Details Why Contact Info Additional [place] In 3 days  [address] [phone]  [facility], [address] Emergency Department Emergency Medicine  If symptoms worsen [address] [phone]  [address] Emergency Department, [address] [place] [address], [address]          Discharge Medication List as of 12/30/2022  9:22 AM CONTINUE these medications which have NOT CHANGED    Details   albuterol (PROVENTIL HFA,VENTOLIN HFA) 90 mcg/act inhaler Inhale 1 puff every 6 (six) hours as needed for wheezing, Historical Med      ciprofloxacin-dexamethasone (CIPRODEX) otic suspension Administer 4 drops into the left ear 2 (two) times a day, Starting Thu 9/8/2022, Normal      famotidine (PEPCID) 20 mg tablet Take 1 tablet (20 mg total) by mouth 2 (two) times a day, Starting Sat 7/30/2022, Until Mon 8/29/2022, Normal      montelukast (SINGULAIR) 10 mg tablet Take 10 mg by mouth daily at bedtime, Historical Med      naproxen (Naprosyn) 500 mg tablet Take 1 tablet (500 mg total) by mouth 2 (two) times a day with meals, Starting Thu 9/8/2022, Normal      omeprazole (PriLOSEC) 20 mg delayed release capsule Take 1 capsule (20 mg total) by mouth daily, Starting Tue 9/14/2021, Print             No discharge procedures on file      PDMP Review     None          ED Provider  Electronically Signed by           Mehdi Holland PA-C  12/30/22 9539

## 2022-12-31 LAB
FLUAV RNA RESP QL NAA+PROBE: NEGATIVE
FLUBV RNA RESP QL NAA+PROBE: NEGATIVE
SARS-COV-2 RNA RESP QL NAA+PROBE: POSITIVE

## 2023-09-01 ENCOUNTER — HOSPITAL ENCOUNTER (EMERGENCY)
Facility: HOSPITAL | Age: 43
Discharge: HOME | End: 2023-09-01
Attending: EMERGENCY MEDICINE
Payer: COMMERCIAL

## 2023-09-01 VITALS
TEMPERATURE: 97.2 F | RESPIRATION RATE: 18 BRPM | HEART RATE: 78 BPM | OXYGEN SATURATION: 100 % | DIASTOLIC BLOOD PRESSURE: 104 MMHG | SYSTOLIC BLOOD PRESSURE: 193 MMHG

## 2023-09-01 DIAGNOSIS — G43.909 MIGRAINE: Primary | ICD-10-CM

## 2023-09-01 LAB
ATRIAL RATE: 75 BPM
P AXIS: 65 DEGREES
PR INTERVAL: 166 MS
QRS AXIS: 49 DEGREES
QRSD INTERVAL: 74 MS
QT INTERVAL: 392 MS
QTC INTERVAL: 437 MS
T WAVE AXIS: 32 DEGREES
VENTRICULAR RATE: 75 BPM

## 2023-09-01 PROCEDURE — 96374 THER/PROPH/DIAG INJ IV PUSH: CPT

## 2023-09-01 PROCEDURE — 99283 EMERGENCY DEPT VISIT LOW MDM: CPT

## 2023-09-01 PROCEDURE — 96375 TX/PRO/DX INJ NEW DRUG ADDON: CPT

## 2023-09-01 PROCEDURE — 93005 ELECTROCARDIOGRAM TRACING: CPT

## 2023-09-01 PROCEDURE — 93010 ELECTROCARDIOGRAM REPORT: CPT | Performed by: INTERNAL MEDICINE

## 2023-09-01 PROCEDURE — 99284 EMERGENCY DEPT VISIT MOD MDM: CPT | Performed by: EMERGENCY MEDICINE

## 2023-09-01 PROCEDURE — 96365 THER/PROPH/DIAG IV INF INIT: CPT

## 2023-09-01 RX ORDER — KETOROLAC TROMETHAMINE 30 MG/ML
15 INJECTION, SOLUTION INTRAMUSCULAR; INTRAVENOUS ONCE
Status: COMPLETED | OUTPATIENT
Start: 2023-09-01 | End: 2023-09-01

## 2023-09-01 RX ORDER — MAGNESIUM HYDROXIDE/ALUMINUM HYDROXICE/SIMETHICONE 120; 1200; 1200 MG/30ML; MG/30ML; MG/30ML
30 SUSPENSION ORAL ONCE
Status: COMPLETED | OUTPATIENT
Start: 2023-09-01 | End: 2023-09-01

## 2023-09-01 RX ORDER — ACETAMINOPHEN 325 MG/1
975 TABLET ORAL ONCE
Status: COMPLETED | OUTPATIENT
Start: 2023-09-01 | End: 2023-09-01

## 2023-09-01 RX ORDER — METOCLOPRAMIDE HYDROCHLORIDE 5 MG/ML
10 INJECTION INTRAMUSCULAR; INTRAVENOUS ONCE
Status: COMPLETED | OUTPATIENT
Start: 2023-09-01 | End: 2023-09-01

## 2023-09-01 RX ADMIN — ALUMINUM HYDROXIDE, MAGNESIUM HYDROXIDE, AND SIMETHICONE 30 ML: 200; 200; 20 SUSPENSION ORAL at 16:47

## 2023-09-01 RX ADMIN — SODIUM CHLORIDE, SODIUM LACTATE, POTASSIUM CHLORIDE, AND CALCIUM CHLORIDE 1000 ML: .6; .31; .03; .02 INJECTION, SOLUTION INTRAVENOUS at 16:46

## 2023-09-01 RX ADMIN — ACETAMINOPHEN 975 MG: 325 TABLET, FILM COATED ORAL at 16:47

## 2023-09-01 RX ADMIN — METOCLOPRAMIDE 10 MG: 5 INJECTION, SOLUTION INTRAMUSCULAR; INTRAVENOUS at 16:47

## 2023-09-01 RX ADMIN — KETOROLAC TROMETHAMINE 15 MG: 30 INJECTION, SOLUTION INTRAMUSCULAR; INTRAVENOUS at 16:47

## 2023-09-01 NOTE — ED ATTENDING ATTESTATION
9/1/2023  IPedro MD, saw and evaluated the patient. I have discussed the patient with the resident/non-physician practitioner and agree with the resident's/non-physician practitioner's findings, Plan of Care, and MDM as documented in the resident's/non-physician practitioner's note, except where noted. All available labs and Radiology studies were reviewed. I was present for key portions of any procedure(s) performed by the resident/non-physician practitioner and I was immediately available to provide assistance. At this point I agree with the current assessment done in the Emergency Department. I have conducted an independent evaluation of this patient a history and physical is as follows:    68-year-old woman history of headaches presenting with bilateral frontal headache which has not improved with home medications. Feels consistent with prior headaches in quality and intensity. Associated with some epigastric discomfort and nausea. Patient awake and alert in no acute distress. Abdomen soft and nondistended with mild epigastric tenderness, no rebound or guarding. No gross focal neurologic deficit. We will treat symptomatically and reassess.     ED Course         Critical Care Time  Procedures

## 2023-09-01 NOTE — ED PROVIDER NOTES
History  Chief Complaint   Patient presents with   • Headache   • Chest Pain     Pt c/o chest pain that started 3x days ago. Pt c/o headache. 39-year-old woman with relevant PMH migraines, asthma, and GERD presents with chest pain and headache. Initially, she had some abdominal discomfort with diarrhea a few days ago. It is improving but continues into today. Yesterday, she had a headache while at work across the front of her head similar to her prior headaches. She is having some neck pain and photosensitivity. She has nausea but no vomiting. She has not noted any vision or hearing changes. She is reporting epigastric burning sensation radiating up the middle of her chest, which is a similar pain she's had in the past. She denies any thunderclap or worst headache of her life. Prior to Admission Medications   Prescriptions Last Dose Informant Patient Reported? Taking? albuterol (PROVENTIL HFA,VENTOLIN HFA) 90 mcg/act inhaler   Yes No   Sig: Inhale 1 puff every 6 (six) hours as needed for wheezing   ciprofloxacin-dexamethasone (CIPRODEX) otic suspension   No No   Sig: Administer 4 drops into the left ear 2 (two) times a day   famotidine (PEPCID) 20 mg tablet   No No   Sig: Take 1 tablet (20 mg total) by mouth 2 (two) times a day   montelukast (SINGULAIR) 10 mg tablet   Yes No   Sig: Take 10 mg by mouth daily at bedtime   naproxen (Naprosyn) 500 mg tablet   No No   Sig: Take 1 tablet (500 mg total) by mouth 2 (two) times a day with meals   omeprazole (PriLOSEC) 20 mg delayed release capsule   No No   Sig: Take 1 capsule (20 mg total) by mouth daily      Facility-Administered Medications: None       Past Medical History:   Diagnosis Date   • Asthma    • GERD (gastroesophageal reflux disease)        Past Surgical History:   Procedure Laterality Date   •  SECTION         History reviewed. No pertinent family history. I have reviewed and agree with the history as documented.     E-Cigarette/Vaping E-Cigarette/Vaping Substances     Social History     Tobacco Use   • Smoking status: Never   • Smokeless tobacco: Never   Substance Use Topics   • Alcohol use: Yes     Comment: rarely   • Drug use: No        Review of Systems   Constitutional: Negative for chills and fever. HENT: Negative for ear pain and sore throat. Eyes: Negative for pain and visual disturbance. Respiratory: Negative for cough, shortness of breath and wheezing. Cardiovascular: Negative for chest pain and palpitations. Gastrointestinal: Positive for diarrhea. Negative for abdominal pain, constipation and vomiting. Genitourinary: Negative for dysuria, frequency, hematuria and vaginal bleeding. Musculoskeletal: Negative for arthralgias and back pain. Skin: Negative for color change and rash. Neurological: Positive for headaches. Negative for seizures and syncope. Psychiatric/Behavioral: Negative for agitation and confusion. Physical Exam  ED Triage Vitals   Temperature Pulse Respirations Blood Pressure SpO2   09/01/23 1452 09/01/23 1452 09/01/23 1452 09/01/23 1452 09/01/23 1452   (!) 97.2 °F (36.2 °C) 78 18 (!) 193/104 100 %      Temp Source Heart Rate Source Patient Position - Orthostatic VS BP Location FiO2 (%)   09/01/23 1452 09/01/23 1452 09/01/23 1452 09/01/23 1452 --   Temporal Monitor Lying Right arm       Pain Score       09/01/23 1646       8             Orthostatic Vital Signs  Vitals:    09/01/23 1452   BP: (!) 193/104   Pulse: 78   Patient Position - Orthostatic VS: Lying       Physical Exam  Vitals and nursing note reviewed. Constitutional:       General: She is not in acute distress. Appearance: Normal appearance. She is well-developed. HENT:      Head: Normocephalic and atraumatic. Right Ear: External ear normal.      Left Ear: External ear normal.      Nose: Nose normal. No congestion. Mouth/Throat:      Mouth: Mucous membranes are moist.      Pharynx: Oropharynx is clear.    Eyes: Extraocular Movements: Extraocular movements intact. Pupils: Pupils are equal, round, and reactive to light. Cardiovascular:      Rate and Rhythm: Normal rate and regular rhythm. Pulmonary:      Effort: Pulmonary effort is normal. No respiratory distress. Breath sounds: Normal breath sounds. Abdominal:      Palpations: Abdomen is soft. Tenderness: There is no abdominal tenderness. There is no guarding or rebound. Musculoskeletal:         General: Normal range of motion. Cervical back: Normal range of motion and neck supple. Skin:     General: Skin is warm and dry. Neurological:      Mental Status: She is alert and oriented to person, place, and time. Mental status is at baseline. Cranial Nerves: No cranial nerve deficit. Sensory: No sensory deficit. Motor: No weakness. Gait: Gait normal.      Comments: 5/5 strength of the bilateral upper and lower extremities. No sensory deficits of the upper or lower extremities. Psychiatric:         Mood and Affect: Mood normal.         Behavior: Behavior normal.         ED Medications  Medications   metoclopramide (REGLAN) injection 10 mg (10 mg Intravenous Given 9/1/23 1647)   ketorolac (TORADOL) injection 15 mg (15 mg Intravenous Given 9/1/23 1647)   acetaminophen (TYLENOL) tablet 975 mg (975 mg Oral Given 9/1/23 1647)   aluminum-magnesium hydroxide-simethicone (MAALOX) oral suspension 30 mL (30 mL Oral Given 9/1/23 1647)   lactated ringers bolus 1,000 mL (0 mL Intravenous Stopped 9/1/23 1804)       Diagnostic Studies  Results Reviewed     None                 No orders to display         Procedures  Procedures      ED Course  ED Course as of 09/01/23 2100   [facility] Sep 01, 2023   0159 This ECG was interpreted by me. The ECG demonstrates Normal sinus rhythm, normal intervals, normal axis, normal QRS, no acute ST changes present. [address] [place] performed urine pregancy test, which was negative.  It is in the other procedures tab.       SBIRT 22yo+    Flowsheet Row Most Recent Value   Initial Alcohol Screen: US AUDIT-C     1. How often do you have a drink containing alcohol? 0 Filed at: 09/01/2023 1452   2. How many drinks containing alcohol do you have on a typical day you are drinking? 0 Filed at: 09/01/2023 1452   3a. Male UNDER 65: How often do you have five or more drinks on one occasion? 0 Filed at: 09/01/2023 1452   3b. FEMALE Any Age, or MALE 65+: How often do you have 4 or more drinks on one occassion? 0 Filed at: 09/01/2023 1452   Audit-C Score 0 Filed at: 09/01/2023 1452   SHOAIB: How many times in the past year have you. .. Used an illegal drug or used a prescription medication for non-medical reasons? Never Filed at: 09/01/2023 1452                Medical Decision Making  Presents with headache and epigstric pain. She is denying any chest pain as per triage note. She reports this is a typical headache, so we will treat her with a headache cocktail and for presumed gastritis. Will check a pregnancy test.    Pregnancy test negative (located in other procedure tab). She reported much improvement of her symptoms with headache cocktail. Presumed tension or migraine headache. Patient in agreement with plan and questions were answered. Verbalized understanding of return precautions. Portions or all of this note were generated using voice recognition software. Occasional wrong word or "sound a like" substitutions may have occurred due to the inherent limitations of voice recognition software. Please interpret any errors within the intended context of the whole sentence or idea. Risk  OTC drugs. Prescription drug management.             Disposition  Final diagnoses:   Migraine     Time reflects when diagnosis was documented in both MDM as applicable and the Disposition within this note     Time User Action Codes Description Comment    9/1/2023  5:40 PM Paola Has Add [V54.351] Migraine       ED Disposition     ED Disposition   Discharge    Condition   Stable    Date/Time   Fri Sep 1, 2023  5:40 PM    Comment   Rene Morgan discharge to home/self care. Follow-up Information    None         Discharge Medication List as of 9/1/2023  5:44 PM      CONTINUE these medications which have NOT CHANGED    Details   albuterol (PROVENTIL HFA,VENTOLIN HFA) 90 mcg/act inhaler Inhale 1 puff every 6 (six) hours as needed for wheezing, Historical Med      ciprofloxacin-dexamethasone (CIPRODEX) otic suspension Administer 4 drops into the left ear 2 (two) times a day, Starting Thu 9/8/2022, Normal      famotidine (PEPCID) 20 mg tablet Take 1 tablet (20 mg total) by mouth 2 (two) times a day, Starting Sat 7/30/2022, Until Mon 8/29/2022, Normal      montelukast (SINGULAIR) 10 mg tablet Take 10 mg by mouth daily at bedtime, Historical Med      naproxen (Naprosyn) 500 mg tablet Take 1 tablet (500 mg total) by mouth 2 (two) times a day with meals, Starting Thu 9/8/2022, Normal      omeprazole (PriLOSEC) 20 mg delayed release capsule Take 1 capsule (20 mg total) by mouth daily, Starting Tue 9/14/2021, Print           No discharge procedures on file. PDMP Review     None           ED Provider  Attending physically available and evaluated Rene Morgan. I managed the patient along with the ED Attending.     Electronically Signed by         Federico Mills MD  09/01/23 2100

## 2023-09-01 NOTE — DISCHARGE INSTRUCTIONS
Usted fue visto por dolor de jose, dolor epigástrico y [place]. No encontramos causas emergentes para gaby síntomas. Se sintió mejor después de que tratamos montoya dolor de [place]. Abiel un seguimiento con montoya atención primaria según sea necesario.

## 2023-09-01 NOTE — Clinical Note
Penny Coughlin was seen and treated in our emergency department on 9/1/2023. Diagnosis:     Afia Fink  may return to work on return date. She may return on this date: 09/02/2023         If you have any questions or concerns, please don't hesitate to call.       Kendra Guillen MD    ______________________________           _______________          _______________  [facility] [place] [facility]                              Date                                Time

## 2023-12-06 ENCOUNTER — TELEPHONE (OUTPATIENT)
Dept: GASTROENTEROLOGY | Facility: CLINIC | Age: 43
End: 2023-12-06

## 2023-12-06 ENCOUNTER — OFFICE VISIT (OUTPATIENT)
Dept: GASTROENTEROLOGY | Facility: CLINIC | Age: 43
End: 2023-12-06
Payer: COMMERCIAL

## 2023-12-06 VITALS
BODY MASS INDEX: 25.49 KG/M2 | DIASTOLIC BLOOD PRESSURE: 78 MMHG | WEIGHT: 158.6 LBS | HEIGHT: 66 IN | SYSTOLIC BLOOD PRESSURE: 148 MMHG | TEMPERATURE: 96.5 F

## 2023-12-06 DIAGNOSIS — R11.0 NAUSEA IN ADULT: ICD-10-CM

## 2023-12-06 DIAGNOSIS — R10.13 EPIGASTRIC PAIN: Primary | ICD-10-CM

## 2023-12-06 DIAGNOSIS — Z12.11 COLON CANCER SCREENING: ICD-10-CM

## 2023-12-06 DIAGNOSIS — K76.9 LIVER LESION, RIGHT LOBE: ICD-10-CM

## 2023-12-06 DIAGNOSIS — R63.4 WEIGHT LOSS, UNINTENTIONAL: ICD-10-CM

## 2023-12-06 PROBLEM — N84.0 ENDOMETRIAL POLYP: Status: ACTIVE | Noted: 2021-07-08

## 2023-12-06 PROBLEM — M54.2 NECK PAIN: Status: ACTIVE | Noted: 2022-09-15

## 2023-12-06 PROBLEM — R53.83 OTHER FATIGUE: Status: ACTIVE | Noted: 2022-08-03

## 2023-12-06 PROBLEM — G43.009 MIGRAINE WITHOUT AURA AND WITHOUT STATUS MIGRAINOSUS, NOT INTRACTABLE: Status: ACTIVE | Noted: 2022-08-03

## 2023-12-06 PROBLEM — R14.0 BLOATING: Status: ACTIVE | Noted: 2023-06-15

## 2023-12-06 PROBLEM — J45.30 MILD PERSISTENT ASTHMA WITHOUT COMPLICATION: Status: ACTIVE | Noted: 2019-02-08

## 2023-12-06 PROBLEM — N93.9 ABNORMAL UTERINE BLEEDING (AUB): Status: ACTIVE | Noted: 2021-07-08

## 2023-12-06 PROBLEM — M54.50 LUMBAR BACK PAIN: Status: ACTIVE | Noted: 2021-01-04

## 2023-12-06 PROBLEM — J30.89 OTHER ALLERGIC RHINITIS: Status: ACTIVE | Noted: 2023-08-17

## 2023-12-06 PROBLEM — Z78.9 NON-ENGLISH SPEAKING PATIENT: Status: ACTIVE | Noted: 2021-07-08

## 2023-12-06 PROBLEM — N92.1 MENORRHAGIA WITH IRREGULAR CYCLE: Status: ACTIVE | Noted: 2022-12-14

## 2023-12-06 PROBLEM — J30.1 SEASONAL ALLERGIC RHINITIS DUE TO POLLEN: Status: ACTIVE | Noted: 2023-08-17

## 2023-12-06 PROBLEM — K21.9 GASTROESOPHAGEAL REFLUX DISEASE WITHOUT ESOPHAGITIS: Status: ACTIVE | Noted: 2023-06-15

## 2023-12-06 PROCEDURE — 99204 OFFICE O/P NEW MOD 45 MIN: CPT | Performed by: PHYSICIAN ASSISTANT

## 2023-12-06 RX ORDER — BUDESONIDE 90 UG/1
2 AEROSOL, POWDER RESPIRATORY (INHALATION) 2 TIMES DAILY
COMMUNITY
Start: 2023-08-17

## 2023-12-06 RX ORDER — PANTOPRAZOLE SODIUM 40 MG/1
40 TABLET, DELAYED RELEASE ORAL 2 TIMES DAILY
Qty: 60 TABLET | Refills: 3 | Status: SHIPPED | OUTPATIENT
Start: 2023-12-06

## 2023-12-06 NOTE — PROGRESS NOTES
[facility] - Outpatient Consultation New Patient  Jack Moss 37 y.o. female MRN: [id number]  Encounter: [id number]  ASSESSMENT AND PLAN:    1. Epigastric pain  2. Nausea in adult  3. Weight loss, unintentional  Patient with significant ongoing epigastric pain, nausea with associated weight loss. Symptoms persist despite pantoprazole 40 mg once daily in the morning. Will check celiac panel  Will increase PPI dosing, patient will start pantoprazole 40 mg twice daily, before breakfast and before dinner  Will order EGD with biopsy to r/o esophagitis/ gastritis/ H pylori/ PUD. I educated patient on GERD diet and lifestyle including avoidance of trigger foods, smaller frequent meals, not eating close to bedtime.    - EGD; Future  - Tissue transglutaminase, IgA; Future  - IgA; Future  - pantoprazole (PROTONIX) 40 mg tablet; Take 1 tablet (40 mg total) by mouth 2 (two) times a day Before breakfast and before dinner  Dispense: 60 tablet; Refill: 3    4. Liver lesion, right lobe  Seen on recent right upper quadrant ultrasound. Will obtain MRI  - MRI abdomen w wo contrast; Future    5. Colon cancer screening  She is due for average risk colon cancer screening. Will plan for screening colonoscopy at time of EGD. She will cancel her upcoming colonoscopy scheduled in March at [facility].    - Colonoscopy; Future  - polyethylene glycol (GOLYTELY) 4000 mL solution; Take 4,000 mL by mouth once for 1 dose Take as directed by office instructions prior to colonoscopy. Dispense: 4000 mL; Refill: 0      Patient was instructed to call the office with any questions, concerns, new/ worsening/ persisting GI symptoms. Advised patient go to the ER with any severe or worsening abdominal pain, fevers/ chills, intractable N/V, chest pain, SOB, dizziness, lightheadedness, feeling something stuck in esophagus that will not go down.  Patient expressed understanding and is in agreement with treatment plan. Will plan to follow up after diagnostic tests   ________________________________________________________    HPI:     iPad used for encounter # [id number]    Patient is new to me in our office. Patient with past medical history of GERD, asthma, allergies, migraines, abnormal uterine bleeding presents to the office today to discuss multiple GI complaints. Patient complains of worsening abdominal pain x 3 months. Pain would come and go, now occurring daily. Pain located in epigastric pain. Pain described as dull and burning. Pain worse after eating. No certain food triggers. Associated nausea no vomiting   Also associated bloating   She denies heartburn and acid reflux   Since onset of symptoms she has lost weight, she is not trying to lose weight. She notes decreased appetite and not able to eat as much due to pain. She is eating a blander diet. Patient denies any fevers/ chills, change in bowel habits, diarrhea, constipation, black or bloody stools, dysphagia, odynophagia. Denies chest pain, SOB    She was given sucralfate in the ER with some help. She is taking pantoprazole daily   x 1 month without help of symptoms     Tobacco use- None  Alcohol use- None recent, occasionally prior to symptoms   NSAID use- None   No prior colonoscopy   She thinks she may have had an EGD many years ago but is unsure   She denies FH of CRC or colon polyps   Appears she is scheduled for colonoscopy in March at [address]     REVIEW OF SYSTEMS:    Review of Systems   Constitutional:  Positive for appetite change and unexpected weight change. Negative for chills and fever (weight loss). Activity change: decreased. HENT:  Negative for ear pain and sore throat. Eyes:  Negative for pain and visual disturbance. Respiratory:  Negative for cough and shortness of breath. Cardiovascular:  Negative for chest pain and palpitations. Gastrointestinal:  Positive for abdominal pain and nausea. Negative for vomiting. Genitourinary:  Negative for dysuria and hematuria. Musculoskeletal:  Negative for arthralgias and back pain. Skin:  Negative for color change and rash. Neurological:  Negative for seizures and syncope. All other systems reviewed and are negative. Historical Information   Past Medical History:   Diagnosis Date    Asthma     GERD (gastroesophageal reflux disease)      Past Surgical History:   Procedure Laterality Date     SECTION       Social History   Social History     Substance and Sexual Activity   Alcohol Use Yes    Comment: rarely     Social History     Substance and Sexual Activity   Drug Use No     Social History     Tobacco Use   Smoking Status Never   Smokeless Tobacco Never     History reviewed. No pertinent family history. Meds/Allergies       Current Outpatient Medications:     albuterol (PROVENTIL HFA,VENTOLIN HFA) 90 mcg/act inhaler    budesonide (Pulmicort Flexhaler) 90 MCG/ACT inhaler    montelukast (SINGULAIR) 10 mg tablet    naproxen (Naprosyn) 500 mg tablet    omeprazole (PriLOSEC) 20 mg delayed release capsule    ciprofloxacin-dexamethasone (CIPRODEX) otic suspension    famotidine (PEPCID) 20 mg tablet    No Known Allergies        Objective   Wt Readings from Last 3 Encounters:   23 71.9 kg (158 lb 9.6 oz)   21 79.4 kg (175 lb)   19 79.4 kg (175 lb)     Temp Readings from Last 3 Encounters:   23 (!) 96.5 °F (35.8 °C)   23 (!) 97.2 °F (36.2 °C) (Temporal)   22 98.7 °F (37.1 °C) (Oral)     BP Readings from Last 3 Encounters:   23 148/78   23 (!) 193/104   22 (!) 177/108     Pulse Readings from Last 3 Encounters:   23 78   22 75   10/31/22 76        PHYSICAL EXAM:     Physical Exam  Vitals reviewed. Constitutional:       General: She is not in acute distress. Appearance: She is not toxic-appearing. HENT:      Head: Normocephalic and atraumatic.    Eyes:      Extraocular Movements: Extraocular movements intact. Conjunctiva/sclera: Conjunctivae normal.   Cardiovascular:      Rate and Rhythm: Normal rate and regular rhythm. Pulmonary:      Effort: Pulmonary effort is normal. No respiratory distress. Breath sounds: Normal breath sounds. Abdominal:      General: Bowel sounds are normal.      Palpations: Abdomen is soft. Tenderness: There is abdominal tenderness in the epigastric area. Musculoskeletal:         General: No swelling or tenderness. Cervical back: Normal range of motion and neck supple. Skin:     General: Skin is warm and dry. Coloration: Skin is not jaundiced. Neurological:      General: No focal deficit present. Mental Status: She is alert and oriented to person, place, and time. Mental status is at baseline. Psychiatric:         Mood and Affect: Mood normal.         Behavior: Behavior normal.         Thought Content: Thought content normal.         Lab Results:   Lab Results   Component Value Date    WBC 8.73 10/31/2022    HGB 12.4 10/31/2022    HCT 41.5 10/31/2022    MCV 81 (L) 10/31/2022     10/31/2022       Lab Results   Component Value Date    SODIUM 139 10/31/2022    K 4.2 10/31/2022     10/31/2022    CO2 29 10/31/2022    AGAP 2 (L) 10/31/2022    BUN 8 10/31/2022    CREATININE 0.74 10/31/2022    GLUC 97 10/31/2022    CALCIUM 9.3 10/31/2022    AST 12 10/31/2022    ALT 11 (L) 10/31/2022    ALKPHOS 63 10/31/2022    TP 7.3 10/31/2022    TBILI 0.46 10/31/2022    EGFR 98 10/31/2022     Radiology Results:   US ABDOMEN LIMITED (RUQ)    Result Date: 11/29/2023  Narrative: History: RUQ /epigastric pain. 40-year-old female Exam: Ultrasound of the right upper quadrant was performed on 11/29/2023. Comparison: None. Findings: Liver: Enlarged, 19 cm.  Echogenicity within normal limits Right upper posterior/lateral subcapsular 2.1 x 1.9 x 1.8 cm rounded/relatively well-defined echogenic mass Garrett.be l-solid-liver-lesion For patients with incidental lesions but without risk factors for liver malignancy, the diagnosis of hepatic hemangioma can be made with noncontrast ultrasound if the following criteria are met %ÏTypical features are present (ie, homogenous, hyperechoic, well-delineated margin) %ÏLesion size is <3 cm %ÏPatient has no history of cirrhosis or extrahepatic malignancy (risk factors for hepatocellular carcinoma (eg, cirrhosis, chronic hepatitis B infection) or for liver metastases (eg, history of extrahepatic malignancy) Due to its benign course, imaging follow-up is not required for typical haemangioma [email or web address]. eu/article/-[id number]/pdf Gallbladder: Within normal limits. No gallstones. Not abnormally distended. No wall thickening, pericholecystic fluid, or reported sonographic Johnston sign present Bile Ducts: No intra or extrahepatic biliary dilation. CBD measures 3 mm. Pancreas: The visualized portions of the pancreas are within normal limits. Known low sensitivity of ultrasound for pancreatic abnormality Right Kidney: Right kidney measures 10.5 cm in length. No evidence of stone or hydronephrosis. Aorta/IVC: The visualized upper abdominal aorta and IVC are normal in caliber.     Impression: Impression: Enlarged liver with incidental right hepatic 2.1 cm echogenic mass/hemangioma Workstation:[id number]      Kashif Jay PA-C   Available on [employer]

## 2023-12-06 NOTE — PATIENT INSTRUCTIONS
Scheduled date of EGD/colonoscopy (as of today):01.25.24  Physician performing EGD/colonoscopy:DR [facility]  Location of EGD/colonoscopy:[facility]  Desired bowel prep reviewed with patient:BRITTA  Instructions reviewed with patient by:CLARA/  Clearances:  N/A  Pt will call to schedule MRI

## 2023-12-15 ENCOUNTER — TRANSCRIBE ORDERS (OUTPATIENT)
Dept: GASTROENTEROLOGY | Facility: CLINIC | Age: 43
End: 2023-12-15

## 2024-01-11 ENCOUNTER — ANESTHESIA EVENT (OUTPATIENT)
Dept: ANESTHESIOLOGY | Facility: HOSPITAL | Age: 44
End: 2024-01-11

## 2024-01-11 ENCOUNTER — ANESTHESIA (OUTPATIENT)
Dept: ANESTHESIOLOGY | Facility: HOSPITAL | Age: 44
End: 2024-01-11